# Patient Record
Sex: FEMALE | Race: WHITE | NOT HISPANIC OR LATINO | URBAN - METROPOLITAN AREA
[De-identification: names, ages, dates, MRNs, and addresses within clinical notes are randomized per-mention and may not be internally consistent; named-entity substitution may affect disease eponyms.]

---

## 2017-06-27 ENCOUNTER — OFFICE VISIT (OUTPATIENT)
Dept: FAMILY MEDICINE CLINIC | Facility: CLINIC | Age: 22
End: 2017-06-27

## 2017-06-27 VITALS
DIASTOLIC BLOOD PRESSURE: 67 MMHG | HEIGHT: 65 IN | SYSTOLIC BLOOD PRESSURE: 98 MMHG | BODY MASS INDEX: 30.99 KG/M2 | OXYGEN SATURATION: 98 % | RESPIRATION RATE: 18 BRPM | WEIGHT: 186 LBS | HEART RATE: 92 BPM

## 2017-06-27 DIAGNOSIS — G44.209 TENSION HEADACHE: Primary | ICD-10-CM

## 2017-06-27 DIAGNOSIS — G43.101 MIGRAINE WITH AURA AND WITH STATUS MIGRAINOSUS, NOT INTRACTABLE: ICD-10-CM

## 2017-06-27 PROCEDURE — 99213 OFFICE O/P EST LOW 20 MIN: CPT | Performed by: NURSE PRACTITIONER

## 2017-06-27 NOTE — PROGRESS NOTES
Subjective   Brielle Alcocer is a 21 y.o. female.   Chief Complaint   Patient presents with   • Headache     x 3 weeks, some relief sometimes. startes when she wakes up and then when she startes moving around and then is still present at bed time. Nsaid not effective     Vitals:    06/27/17 1419   BP: 98/67   Pulse: 92   Resp: 18   SpO2: 98%     Allergies   Allergen Reactions   • Penicillins        Headache    This is a new problem. The current episode started 1 to 4 weeks ago (3 weeks). The pain is located in the bilateral and occipital region. The pain radiates to the left neck and right neck. The pain quality is not similar to prior headaches. The quality of the pain is described as band-like. The pain is at a severity of 5/10. The pain is moderate. Associated symptoms include muscle aches (neck), neck pain (stiffness), phonophobia, photophobia (worse in the morning) and a visual change (this morning). Pertinent negatives include no blurred vision, coughing, dizziness, eye pain, eye redness, eye watering, fever, nausea, sinus pressure, vomiting or weakness. The symptoms are aggravated by noise and emotional stress. She has tried NSAIDs and acetaminophen for the symptoms. The treatment provided mild relief. Her past medical history is significant for migraines in the family.        The following portions of the patient's history were reviewed and updated as appropriate: allergies, current medications, past family history, past medical history, past social history, past surgical history and problem list.    Review of Systems   Constitutional: Negative for chills, diaphoresis, fatigue and fever.   HENT: Negative for congestion, postnasal drip and sinus pressure.    Eyes: Positive for photophobia (worse in the morning). Negative for blurred vision, pain and redness.   Respiratory: Negative for cough and shortness of breath.    Gastrointestinal: Negative for nausea and vomiting.   Musculoskeletal: Positive for neck  pain (stiffness).   Neurological: Positive for headaches. Negative for dizziness, facial asymmetry, speech difficulty, weakness and light-headedness.   All other systems reviewed and are negative.      Objective   Physical Exam   Constitutional: She is oriented to person, place, and time. She appears well-developed and well-nourished.   HENT:   Head: Normocephalic and atraumatic.   Right Ear: External ear normal.   Left Ear: External ear normal.   Mouth/Throat: Oropharynx is clear and moist.   Eyes: EOM are normal. Pupils are equal, round, and reactive to light.   Neck: Normal range of motion.   Cardiovascular: Normal rate.    Pulmonary/Chest: Effort normal.   Neurological: She is alert and oriented to person, place, and time. No cranial nerve deficit.   Psychiatric: She has a normal mood and affect. Her behavior is normal. Judgment and thought content normal.   Nursing note and vitals reviewed.      Assessment/Plan   Problems Addressed this Visit     None      Visit Diagnoses     Tension headache    -  Primary    Migraine with aura and with status migrainosus, not intractable            Continue NSAIDs as needed. Try heat and relaxation to back of neck to relieve tension.

## 2018-01-29 ENCOUNTER — OFFICE VISIT (OUTPATIENT)
Dept: FAMILY MEDICINE CLINIC | Facility: CLINIC | Age: 23
End: 2018-01-29

## 2018-01-29 VITALS
OXYGEN SATURATION: 99 % | WEIGHT: 186 LBS | TEMPERATURE: 98.8 F | DIASTOLIC BLOOD PRESSURE: 70 MMHG | HEART RATE: 96 BPM | SYSTOLIC BLOOD PRESSURE: 118 MMHG | HEIGHT: 65 IN | BODY MASS INDEX: 30.99 KG/M2

## 2018-01-29 DIAGNOSIS — M22.42 CHONDROMALACIA, PATELLA, LEFT: ICD-10-CM

## 2018-01-29 DIAGNOSIS — Z30.011 ENCOUNTER FOR INITIAL PRESCRIPTION OF CONTRACEPTIVE PILLS: ICD-10-CM

## 2018-01-29 DIAGNOSIS — L70.0 ACNE VULGARIS: ICD-10-CM

## 2018-01-29 DIAGNOSIS — M25.562 ACUTE PAIN OF LEFT KNEE: Primary | ICD-10-CM

## 2018-01-29 PROCEDURE — 99214 OFFICE O/P EST MOD 30 MIN: CPT | Performed by: FAMILY MEDICINE

## 2018-01-29 RX ORDER — NORGESTIMATE AND ETHINYL ESTRADIOL 7DAYSX3 LO
1 KIT ORAL DAILY
Qty: 28 TABLET | Refills: 6 | Status: SHIPPED | OUTPATIENT
Start: 2018-01-29 | End: 2019-01-03

## 2018-01-29 NOTE — PROGRESS NOTES
Subjective   Brielle Alcocer is a 22 y.o. female with   Chief Complaint   Patient presents with   • Contraception     wanting to start OCP   • Knee Pain     Left knee, not constant, more off and on, stairs make it hurt bad.  No known injury   .    History of Present Illness     Patient presents today for new onset of left knee pain for the last 3 to 4 weeks without known injury.  The pain is located to the lateral and inferior aspect of the left Patella.  Going up and down stairs, or getting out of her car causes the left knee to hurt more.  Patient denies left knee effusion or locking.    Patient wishes to start contraception as well.  She has concerns with Acne.  Menstrual cycles are regular at this time.  She has heard that the OCP can benefit her skin condition.  She has used a variety of over-the-counter products that have been ineffective.  She is not now or has ever been sexually active.  She is currently attending cosmetology school.    The following portions of the patient's history were reviewed and updated as appropriate: allergies, current medications, past family history, past medical history, past social history, past surgical history and problem list.    Review of Systems   Constitutional:        Exogenous obesity   Musculoskeletal: Positive for arthralgias (left knee pain).   Skin:        Acne vulgaris       Objective     Vitals:    01/29/18 0921   BP: 118/70   Pulse: 96   Temp: 98.8 °F (37.1 °C)   SpO2: 99%     BP Readings from Last 3 Encounters:   01/29/18 118/70   06/27/17 98/67   06/28/16 110/70      Wt Readings from Last 3 Encounters:   01/29/18 84.4 kg (186 lb)   06/27/17 84.4 kg (186 lb)   06/28/16 84.4 kg (186 lb)        Physical Exam   Constitutional: She is oriented to person, place, and time. Vital signs are normal. She appears well-developed and well-nourished.   Minimal obesity   HENT:   Head: Normocephalic and atraumatic.   Pulmonary/Chest: Effort normal.   Musculoskeletal:        Left  knee: Normal.   Left knee is without deformity, effusion or other abnormality.  Range of motion is full.  Ligaments are stable with negative for sign are negative Lachman.  Trina's is negative.  Entrapment is negative.  There is minimal patellar tenderness with positive patellar crepitance.   Neurological: She is alert and oriented to person, place, and time. She has normal strength. No sensory deficit.   Skin: Skin is warm, dry and intact. Rash noted. Rash is papular and pustular.   Face with numerous erythematous papular lesions as well as pustules and comedones.  This includes facial cheeks as well as mandibular region.  Chest and back are spared.   Psychiatric: She has a normal mood and affect. Her speech is normal and behavior is normal. Judgment and thought content normal. Cognition and memory are normal.   Nursing note and vitals reviewed.      Assessment/Plan   Brielle was seen today for contraception and knee pain.    Diagnoses and all orders for this visit:    Acute pain of left knee  -     Ambulatory Referral to Physical Therapy    Chondromalacia, patella, left  -     Ambulatory Referral to Physical Therapy    Encounter for initial prescription of contraceptive pills    Acne vulgaris    Other orders  -     norgestimate-ethinyl estradiol (ORTHO TRI-CYCLEN LO) 0.18/0.215/0.25 MG-25 MCG per tablet; Take 1 tablet by mouth Daily.        Return in about 3 months (around 4/29/2018).        Scribed for Cristobal Baez MD by Yasmani Arguelles. 01/29/2018    ICristobal MD personally performed the services described in this documentation, as scribed by Yasmani Arguelles in my presence, and it is both accurate and complete

## 2018-01-30 PROBLEM — M22.42 CHONDROMALACIA, PATELLA, LEFT: Status: ACTIVE | Noted: 2018-01-30

## 2018-01-30 PROBLEM — L70.0 ACNE VULGARIS: Status: ACTIVE | Noted: 2018-01-30

## 2018-02-07 ENCOUNTER — TREATMENT (OUTPATIENT)
Dept: PHYSICAL THERAPY | Facility: CLINIC | Age: 23
End: 2018-02-07

## 2018-02-07 DIAGNOSIS — M25.562 ACUTE PAIN OF LEFT KNEE: Primary | ICD-10-CM

## 2018-02-07 PROCEDURE — 97161 PT EVAL LOW COMPLEX 20 MIN: CPT | Performed by: PHYSICAL THERAPIST

## 2018-02-07 PROCEDURE — 97110 THERAPEUTIC EXERCISES: CPT | Performed by: PHYSICAL THERAPIST

## 2018-02-07 NOTE — PROGRESS NOTES
Physical Therapy Initial Evaluation and Plan of Care    TIME IN 11:08 TIME OUT 11:45  Patient: Brielle Alcocer   : 1995  Diagnosis/ICD-10 Code:  Acute pain of left knee [M25.562]  Referring practitioner: Cristobal Baez DO    Subjective Evaluation    History of Present Illness  Onset date: one month ago.  Mechanism of injury: Pt is a 23 y/o WF who reports to the clinic with c/o left knee pain for one month.  Pt denies having a specific injury that caused her knee pain.  Pt is currently in school and has several steps to go up and down through-out the day and started noticing increased knee pain with climbing the steps.  Pt denies having a prior Hx of left knee pain or injuries.        Patient Occupation: college student  Quality of life: excellent    Pain  Current pain ratin  At worst pain ratin  Location: left superior, lateral, and inferior knee pain   Quality: sharp  Relieving factors: change in position (sit or take the pressure off of it )  Aggravating factors: stairs and squatting (bending it with pressure on it )    Diagnostic Tests  No diagnostic tests performed    Treatments  No previous or current treatments  Patient Goals  Patient goals for therapy: decreased pain and return to sport/leisure activities             Objective       Tenderness   Left Knee   Tenderness in the lateral patella, medial patella and superior patella.     Active Range of Motion   Left Knee   Flexion: 135 degrees   Extension: 0 degrees     Right Knee   Flexion: 132 degrees   Extension: 0 degrees     Patellar Static Positioning     Additional Patellar Static Positioning Details  Lateral glide with the last 20 degrees of TKE left knee     Strength/Myotome Testing     Left Hip   Planes of Motion   Flexion: 4  Abduction: 5  Adduction: 4    Right Hip   Planes of Motion   Flexion: 5  Abduction: 5  Adduction: 4+    Left Knee   Flexion: 4+  Extension: 4+    Right Knee   Flexion: 5  Extension: 5    Tests     Left Hip    Negative Ely's and Kristie.   Domingo: Negative.   90/90 SLR: Negative.   SLR: Negative.     Right Hip   Negative Ely's and Kristie.   Domingo: Positive.   90/90 SLR: Negative.  SLR: Negative.     Left Knee   Positive patella-femoral grind.   Negative anterior drawer, lateral Trina, medial Trina, posterior drawer, valgus stress test at 0 degrees, valgus stress test at 30 degrees, varus stress test at 0 degrees and varus stress test at 30 degrees.     Right Knee   Negative anterior drawer, lateral Trina, medial Trina, patella-femoral grind, posterior drawer, valgus stress test at 0 degrees, valgus stress test at 30 degrees, varus stress test at 0 degrees and varus stress test at 30 degrees.          Assessment & Plan     Assessment  Impairments: impaired physical strength and pain with function  Assessment details: Pt is a 21 y/o WF who reports to the clinic with c/o left knee pain, decreased flexibility, tenderness, and decreased functional mobility with getting into and out  of car and going up the steps.      Prognosis: good    Goals  Plan Goals: STGs:3-4 weeks  1. Decrease left  knee pain 1/10 with steps and getting into the car.  2. Decrease left medial, lateral, and superior patella tenderness to minimal to none with palpation.     3. Pt independent with HEP  4 Increase pt's left hip flex and add strength to 4+-5/5  5. Pt has a negative Domingo test B  6.  Increase left knee strength to 5/5.        Plan  Therapy options: will be seen for skilled physical therapy services  Planned modality interventions: cryotherapy, thermotherapy (hydrocollator packs) and ultrasound  Planned therapy interventions: home exercise program, flexibility, strengthening and stretching  Frequency: 1x week  Duration in weeks: 4  Treatment plan discussed with: patient        Manual Therapy:         mins  84399;  Therapeutic Exercise:    15     mins  21729;     Neuromuscular Ольга:        mins  60587;    Therapeutic Activity:           mins  43603;     Gait Training:           mins  25530;     Ultrasound:          mins  85532;    Electrical Stimulation:         mins  41105 ( );  Dry Needling          mins self-pay    Timed Treatment:   15   mins   Total Treatment:     37   mins    PT SIGNATURE: Mariaa Parisi, PT   DATE TREATMENT INITIATED: 2/7/2018    Initial Certification  Certification Period: 5/8/2018  I certify that the therapy services are furnished while this patient is under my care.  The services outlined above are required by this patient, and will be reviewed every 90 days.     PHYSICIAN: Cristobal Baez, DO      DATE:     Please sign and return via fax to [phone].. Thank you, [facility].

## 2018-02-15 ENCOUNTER — TREATMENT (OUTPATIENT)
Dept: PHYSICAL THERAPY | Facility: CLINIC | Age: 23
End: 2018-02-15

## 2018-02-15 DIAGNOSIS — M25.562 ACUTE PAIN OF LEFT KNEE: Primary | ICD-10-CM

## 2018-02-15 PROCEDURE — 97110 THERAPEUTIC EXERCISES: CPT | Performed by: PHYSICAL THERAPIST

## 2018-02-15 PROCEDURE — 97530 THERAPEUTIC ACTIVITIES: CPT | Performed by: PHYSICAL THERAPIST

## 2018-02-15 NOTE — PROGRESS NOTES
Physical Therapy Daily Progress Note    Time In 8:10  Time Out 9:00    Visit # : 2  Brielle Alcocer reports: her knee was really sore after last visit, but denies any increased pain.      Subjective     Objective   See Exercise, Manual, and Modality Logs for complete treatment.       Assessment & Plan     Assessment  Assessment details: Pt is doing well with all stretches and exercises.  Denies having any increased pain with CKC strengthening exercises.  Still slight tenderness left inferior knee joint today.  Will continue to see pt once per week for strengthening then discharge to [facility].          Progress per Plan of Care           Manual Therapy:         mins  60367;  Therapeutic Exercise:   20      mins  74700;     Neuromuscular Ольга:        mins  82133;    Therapeutic Activity:    15      mins  09735;     Gait Training:           mins  49706;     Ultrasound:          mins  00224;    Electrical Stimulation:         mins  77039 ( );  Dry Needling          mins self-pay    Timed Treatment:   35   mins   Total Treatment:     50   mins    Mariaa Parisi, PT  Physical Therapist

## 2018-02-22 ENCOUNTER — TREATMENT (OUTPATIENT)
Dept: PHYSICAL THERAPY | Facility: CLINIC | Age: 23
End: 2018-02-22

## 2018-02-22 DIAGNOSIS — M25.562 ACUTE PAIN OF LEFT KNEE: Primary | ICD-10-CM

## 2018-02-22 PROCEDURE — 97110 THERAPEUTIC EXERCISES: CPT | Performed by: PHYSICAL THERAPIST

## 2018-02-22 PROCEDURE — 97035 APP MDLTY 1+ULTRASOUND EA 15: CPT | Performed by: PHYSICAL THERAPIST

## 2018-02-22 PROCEDURE — 97530 THERAPEUTIC ACTIVITIES: CPT | Performed by: PHYSICAL THERAPIST

## 2018-02-22 NOTE — PROGRESS NOTES
"Physical Therapy Daily Progress Note    Time In 8:07  Time Out 8:46    Visit # : 3  Brielle Alcocer reports: she has noticed her knee popping more and feeling like it has \"air in it.\"      Subjective     Objective       Tenderness   Left Knee   Tenderness in the patellar tendon.     Additional Tenderness Details  Minimal tenderness left inferior medial knee joint      See Exercise, Manual, and Modality Logs for complete treatment.       Assessment & Plan     Assessment  Assessment details: Pt is tolerating treatment fairly well, but having some inferior patella pain and crepitus with deep squats.  Reviewed HEP and reinforced the importance of not squatting too deep and having proper form during lateral knee dips and squats.  Tried pulsed US treatment today to decrease tenderness and pain.  Will reassess pt's symptoms next visit.          Progress per Plan of Care           Manual Therapy:         mins  27064;  Therapeutic Exercise:   20      mins  40898;     Neuromuscular Ольга:        mins  81427;    Therapeutic Activity:    10      mins  27450;     Gait Training:           mins  20586;     Ultrasound:     8     mins  39475;    Electrical Stimulation:         mins  66642 ( );  Dry Needling          mins self-pay    Timed Treatment:  38    mins   Total Treatment:     39   mins    Mariaa Parisi, PT  Physical Therapist  "

## 2018-03-01 ENCOUNTER — TREATMENT (OUTPATIENT)
Dept: PHYSICAL THERAPY | Facility: CLINIC | Age: 23
End: 2018-03-01

## 2018-03-01 DIAGNOSIS — M25.562 ACUTE PAIN OF LEFT KNEE: Primary | ICD-10-CM

## 2018-03-01 PROCEDURE — 97530 THERAPEUTIC ACTIVITIES: CPT | Performed by: PHYSICAL THERAPIST

## 2018-03-01 PROCEDURE — 97110 THERAPEUTIC EXERCISES: CPT | Performed by: PHYSICAL THERAPIST

## 2018-03-01 PROCEDURE — 97035 APP MDLTY 1+ULTRASOUND EA 15: CPT | Performed by: PHYSICAL THERAPIST

## 2018-03-01 NOTE — PROGRESS NOTES
Physical Therapy Daily Progress Note    Time In 9:00  Time Out 9:45    Visit # : 4  Brielle Alcocer reports: her knee is better than it was last week, but she has noticed her right knee is starting to hurt.      Subjective     Objective       Tenderness   Left Knee   Tenderness in the inferior patella, lateral patella, medial patella and patellar tendon.      See Exercise, Manual, and Modality Logs for complete treatment.       Assessment & Plan     Assessment  Assessment details: Pt is responding to treatment well with decreasing tenderness and pain in left knee from last week.  Pt demonstrated good form with squats today without c/o increased knee pain.  Pt starting to c/o right knee pain and educated pt about starting her HEP for both knees.  Feel pt's symptoms in her right knee is due to compensation for the left knee.  Will continue to see pt once per week for strengthening and modalities PRN for pain.          Progress per Plan of Care           Manual Therapy:         mins  81679;  Therapeutic Exercise:   22      mins  28137;     Neuromuscular Ольга:        mins  21893;    Therapeutic Activity:   10       mins  26397;     Gait Training:           mins  64047;     Ultrasound:    8      mins  75714;    Electrical Stimulation:         mins  28481 ( );  Dry Needling          mins self-pay    Timed Treatment:   40   mins   Total Treatment:     45   mins    Mariaa Parisi, PT  Physical Therapist

## 2018-03-09 ENCOUNTER — TREATMENT (OUTPATIENT)
Dept: PHYSICAL THERAPY | Facility: CLINIC | Age: 23
End: 2018-03-09

## 2018-03-09 DIAGNOSIS — M25.562 ACUTE PAIN OF LEFT KNEE: Primary | ICD-10-CM

## 2018-03-09 PROCEDURE — 97110 THERAPEUTIC EXERCISES: CPT | Performed by: PHYSICAL THERAPIST

## 2018-03-09 PROCEDURE — 97530 THERAPEUTIC ACTIVITIES: CPT | Performed by: PHYSICAL THERAPIST

## 2018-03-09 NOTE — PROGRESS NOTES
Physical Therapy Daily Progress Note    Time In 11:00  Time Out 11:40    Visit # : 5  Brielle Alcocer reports: she is doing fine. Pt states she doesn't have any issues.   Pt denies having any knee pain B this week.  States if she gets knee pain it may be with going down the steps, but she is careful and goes down slower.      Subjective     Objective       Palpation     Additional Palpation Details  Pt denies having any point tenderness medial, inferior, and lateral left knee.      Strength/Myotome Testing     Left Hip   Planes of Motion   Flexion: 5  Abduction: 5  Adduction: 5    Left Knee   Flexion: 5  Extension: 5    Tests     Left Hip   Domingo: Negative.     Right Hip   Domingo: Negative.       See Exercise, Manual, and Modality Logs for complete treatment.       Assessment & Plan     Assessment  Assessment details: Pt is doing well with good strength, decreased pain, denies tenderness, and improving functional mobility without pain.  Pt has accomplished all goals and feel pt can continue at home with HEP.          Other           Manual Therapy:         mins  72028;  Therapeutic Exercise:    20     mins  79533;     Neuromuscular Ольга:        mins  94794;    Therapeutic Activity:    10      mins  94805;     Gait Training:           mins  10644;     Ultrasound:          mins  88903;    Electrical Stimulation:         mins  95989 ( );  Dry Needling          mins self-pay    Timed Treatment:  30    mins   Total Treatment:     40   mins    Mariaa Parisi, PT  Physical Therapist

## 2018-03-09 NOTE — PROGRESS NOTES
Discharge Summary  Discharge Summary from Physical Therapy Report      Dates  PT visit: 2/7/2018-3/9/2018  Number of Visits: 5     Discharge Status of Patient: See progress note dated 3/9/2018 for objective measures.      Goals: All Met    Discharge Plan: Continue with current home exercise program as instructed    Date of Discharge 3/9/2018        Mariaa Parisi, PT  Physical Therapist

## 2018-08-23 RX ORDER — NORGESTIMATE AND ETHINYL ESTRADIOL
1 KIT DAILY
Qty: 28 TABLET | Refills: 6 | OUTPATIENT
Start: 2018-08-23 | End: 2019-08-23

## 2019-01-03 ENCOUNTER — OFFICE VISIT (OUTPATIENT)
Dept: FAMILY MEDICINE CLINIC | Facility: CLINIC | Age: 24
End: 2019-01-03

## 2019-01-03 VITALS
OXYGEN SATURATION: 98 % | DIASTOLIC BLOOD PRESSURE: 74 MMHG | RESPIRATION RATE: 16 BRPM | BODY MASS INDEX: 32.65 KG/M2 | WEIGHT: 196 LBS | TEMPERATURE: 101.4 F | HEIGHT: 65 IN | HEART RATE: 106 BPM | SYSTOLIC BLOOD PRESSURE: 110 MMHG

## 2019-01-03 DIAGNOSIS — R50.9 FEVER, UNSPECIFIED FEVER CAUSE: ICD-10-CM

## 2019-01-03 DIAGNOSIS — J02.9 SORE THROAT: ICD-10-CM

## 2019-01-03 DIAGNOSIS — J11.1 INFLUENZA: Primary | ICD-10-CM

## 2019-01-03 LAB
EXPIRATION DATE: ABNORMAL
FLUAV AG NPH QL: POSITIVE
FLUBV AG NPH QL: NEGATIVE
INTERNAL CONTROL: ABNORMAL
Lab: ABNORMAL

## 2019-01-03 PROCEDURE — 99213 OFFICE O/P EST LOW 20 MIN: CPT | Performed by: NURSE PRACTITIONER

## 2019-01-03 PROCEDURE — 87804 INFLUENZA ASSAY W/OPTIC: CPT | Performed by: NURSE PRACTITIONER

## 2019-01-03 RX ORDER — OSELTAMIVIR PHOSPHATE 75 MG/1
CAPSULE ORAL
COMMUNITY
Start: 2019-01-02 | End: 2019-05-22

## 2019-01-03 NOTE — PROGRESS NOTES
"Chief Complaint   Patient presents with   • Cough   • Sore Throat   • Fever       Upper Respiratory Infection: Patient complains of symptoms of a URI. Symptoms include congestion, cough, fever and sore throat. Onset of symptoms was 2 days ago, gradually worsening since that time. She also c/o achiness, congestion, post nasal drip and sore throat for the past 2 days .  She is drinking moderate amounts of fluids. Evaluation to date: Seen at [facility] yesterday and diagnosed with flu due to known exposure of flu with friend. Treatment to date: Tamiflu, cough medicine, and ibuprofen with some relief.  .  Ill contacts at home or school or work discussed.    The following portions of the patient's history were reviewed and updated as appropriate: allergies, current medications, past family history, past medical history, past social history, past surgical history and problem list.    A comprehensive review of systems was negative except for: Constitutional: positive for chills, fatigue and fevers  Ears, nose, mouth, throat, and face: positive for nasal congestion and sore throat  Respiratory: positive for cough    Vitals:    01/03/19 1545   BP: 110/74   BP Location: Right arm   Patient Position: Sitting   Cuff Size: Large Adult   Pulse: 106   Resp: 16   Temp: (!) 101.4 °F (38.6 °C)   SpO2: 98%   Weight: 88.9 kg (196 lb)   Height: 165.1 cm (65\")     Gen: Ill appearing, alert  Ears: TM's bulging without redness  Nose:  Congestion  Throat:  Red without exudate, some drainage, tonsils okay  Neck: No LAD  Lung: Good air movement, regular RR  Heart: RR without murmur  Skin: No rash      Assessment/Plan   Brielle was seen today for cough, sore throat and fever.    Diagnoses and all orders for this visit:    Influenza    Sore throat  -     POCT Influenza A/B    Fever, unspecified fever cause  -     POCT Influenza A/B           Patient Instructions   Influenza, Adult  Influenza (“the flu\") is an infection in the lungs, nose, " and throat (respiratory tract). It is caused by a virus. The flu causes many common cold symptoms, as well as a high fever and body aches. It can make you feel very sick.  The flu spreads easily from person to person (is contagious). Getting a flu shot (influenza vaccination) every year is the best way to prevent the flu.  Follow these instructions at home:  · Take over-the-counter and prescription medicines only as told by your doctor.  · Use a cool mist humidifier to add moisture (humidity) to the air in your home. This can make it easier to breathe.  · Rest as needed.  · Drink enough fluid to keep your pee (urine) clear or pale yellow.  · Cover your mouth and nose when you cough or sneeze.  · Wash your hands with soap and water often, especially after you cough or sneeze. If you cannot use soap and water, use hand .  · Stay home from work or school as told by your doctor. Unless you are visiting your doctor, try to avoid leaving home until your fever has been gone for 24 hours without the use of medicine.  · Keep all follow-up visits as told by your doctor. This is important.  How is this prevented?  · Getting a yearly (annual) flu shot is the best way to avoid getting the flu. You may get the flu shot in late summer, fall, or winter. Ask your doctor when you should get your flu shot.  · Wash your hands often or use hand  often.  · Avoid contact with people who are sick during cold and flu season.  · Eat healthy foods.  · Drink plenty of fluids.  · Get enough sleep.  · Exercise regularly.  Contact a doctor if:  · You get new symptoms.  · You have:  ? Chest pain.  ? Watery poop (diarrhea).  ? A fever.  · Your cough gets worse.  · You start to have more mucus.  · You feel sick to your stomach (nauseous).  · You throw up (vomit).  Get help right away if:  · You start to be short of breath or have trouble breathing.  · Your skin or nails turn a bluish color.  · You have very bad pain or stiffness in  your neck.  · You get a sudden headache.  · You get sudden pain in your face or ear.  · You cannot stop throwing up.  This information is not intended to replace advice given to you by your health care provider. Make sure you discuss any questions you have with your health care provider.  Document Released: 09/26/2009 Document Revised: 05/25/2017 Document Reviewed: 10/11/2016  [employer] Interactive Patient Education © 2017 [employer]        Tylenol or Advil as needed for pain, fever, muscle aches  Plenty of fluids  Hand washing discussed  Off work or school note given if needed.  Warm tea for throat.  Pros and cons of antibiotic use discussed    RICHIE Camara  Family Practice  [facility]

## 2019-05-22 ENCOUNTER — OFFICE VISIT (OUTPATIENT)
Dept: FAMILY MEDICINE CLINIC | Facility: CLINIC | Age: 24
End: 2019-05-22

## 2019-05-22 VITALS
SYSTOLIC BLOOD PRESSURE: 118 MMHG | DIASTOLIC BLOOD PRESSURE: 80 MMHG | HEIGHT: 65 IN | OXYGEN SATURATION: 98 % | HEART RATE: 84 BPM | BODY MASS INDEX: 33.32 KG/M2 | WEIGHT: 200 LBS

## 2019-05-22 DIAGNOSIS — F51.04 PSYCHOPHYSIOLOGICAL INSOMNIA: Primary | ICD-10-CM

## 2019-05-22 PROCEDURE — 99213 OFFICE O/P EST LOW 20 MIN: CPT | Performed by: FAMILY MEDICINE

## 2019-05-22 RX ORDER — DOXEPIN HYDROCHLORIDE 10 MG/1
CAPSULE ORAL
Qty: 90 CAPSULE | Refills: 1 | Status: SHIPPED | OUTPATIENT
Start: 2019-05-22 | End: 2019-07-03 | Stop reason: ALTCHOICE

## 2019-05-23 NOTE — PATIENT INSTRUCTIONS
Long discussion in regards to appropriate sleep hygiene.  Doxepin will be initiated with a titration discussed with patient.  She will initiate this product at 1 pill 1 hour before bedtime gradually titrate this dose up as per effect and per side effects.  This office with any adverse change condition or side effect with the above product.

## 2019-05-23 NOTE — PROGRESS NOTES
Subjective   Brielle Alcocer is a 23 y.o. female with   Chief Complaint   Patient presents with   • Insomnia     she actually can sleep, falling asleep is the problem   .    History of Present Illness   23-year-old white female here with complaint of several months of insomnia.  She apparently has had issues going to sleep and sustaining sleep since high school.  She states that she tries to go to sleep at a regular time he has difficulty falling asleep and often lays in bed for several hours.  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  During the day she is somewhat fatigued due to her lack of sleep has difficulty performing her normal task.  In general moods have been good with no evidence of depression or anxiety features.  He does admit to overeating with increased weight gain.  In general she is able to concentrate well and has good motivation.  She does participate in activities of usual enjoyment and denies suicidal or homicidal ideation.  The following portions of the patient's history were reviewed and updated as appropriate: allergies, current medications, past family history, past medical history, past social history, past surgical history and problem list.    Review of Systems   Constitutional:        Obese   Psychiatric/Behavioral: Positive for sleep disturbance. Negative for dysphoric mood. The patient is not nervous/anxious.        Objective     Vitals:    05/22/19 1436   BP: 118/80   Pulse: 84   SpO2: 98%       No results found for this or any previous visit (from the past 168 hour(s)).    Physical Exam   Constitutional: She is oriented to person, place, and time. She appears well-developed and well-nourished.   HENT:   Head: Normocephalic and atraumatic.   Neck: Trachea normal and phonation normal. Neck supple. Normal carotid pulses present. Carotid bruit is not present. No thyroid mass and no thyromegaly present.   Cardiovascular: Normal rate, regular  rhythm and normal heart sounds. Exam reveals no gallop and no friction rub.   No murmur heard.  Pulmonary/Chest: Effort normal and breath sounds normal. No respiratory distress. She has no decreased breath sounds. She has no wheezes. She has no rhonchi. She has no rales.   Lymphadenopathy:     She has no cervical adenopathy.   Neurological: She is alert and oriented to person, place, and time.   Skin: Skin is warm and dry. No rash noted.   Psychiatric: She has a normal mood and affect. Her speech is normal and behavior is normal. Judgment and thought content normal. Cognition and memory are normal.   Nursing note and vitals reviewed.      Assessment/Plan   Brielle was seen today for insomnia.    Diagnoses and all orders for this visit:    Psychophysiological insomnia  -     doxepin (SINEquan) 10 MG capsule; 3 po q hs        Return in about 4 weeks (around 6/19/2019) for Recheck.

## 2019-07-03 ENCOUNTER — OFFICE VISIT (OUTPATIENT)
Dept: FAMILY MEDICINE CLINIC | Facility: CLINIC | Age: 24
End: 2019-07-03

## 2019-07-03 VITALS
OXYGEN SATURATION: 98 % | WEIGHT: 209 LBS | SYSTOLIC BLOOD PRESSURE: 108 MMHG | BODY MASS INDEX: 34.82 KG/M2 | HEIGHT: 65 IN | DIASTOLIC BLOOD PRESSURE: 70 MMHG | HEART RATE: 78 BPM

## 2019-07-03 DIAGNOSIS — F51.04 PSYCHOPHYSIOLOGICAL INSOMNIA: Primary | ICD-10-CM

## 2019-07-03 PROCEDURE — 99213 OFFICE O/P EST LOW 20 MIN: CPT | Performed by: FAMILY MEDICINE

## 2019-07-03 RX ORDER — ZOLPIDEM TARTRATE 10 MG/1
10 TABLET ORAL NIGHTLY PRN
Qty: 30 TABLET | Refills: 3 | Status: SHIPPED | OUTPATIENT
Start: 2019-07-03 | End: 2021-08-17

## 2019-07-06 NOTE — PROGRESS NOTES
Subjective   Brielle Alcocer is a 23 y.o. female with   Chief Complaint   Patient presents with   • Insomnia     follow up on medication, doesnt work as well as she hoped   .    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  Patient was started on doxepin at 10 mg nightly without success.  She gradually increase this dose to a full 50 mg nightly benefit.  She is working full-time living at home with her parents.  She denies overt stress and in general is doing quite well.  Today she has good energy, motivation and good concentration.  She has a long history of exogenous obesity but states that her appetite is normal.  Patient denies increased irritability or easy agitation and there has been no thoughts of suicide or homicide.  The following portions of the patient's history were reviewed and updated as appropriate: allergies, current medications, past family history, past medical history, past social history, past surgical history and problem list.    Review of Systems   Psychiatric/Behavioral: Positive for sleep disturbance.       Objective     Vitals:    07/03/19 1451   BP: 108/70   Pulse: 78   SpO2: 98%       No results found for this or any previous visit (from the past 168 hour(s)).    Physical Exam   Constitutional: She is oriented to person, place, and time. She appears well-developed and well-nourished.   HENT:   Head: Normocephalic and atraumatic.   Neck: Trachea normal and phonation normal. Neck supple. Normal carotid pulses present. Carotid bruit is not present. No thyroid mass and no thyromegaly present.   Cardiovascular: Normal rate, regular rhythm and normal heart sounds. Exam reveals no gallop and no friction rub.   No murmur heard.  Pulmonary/Chest: Effort normal and breath sounds normal. No respiratory distress. She has no decreased breath sounds. She has no wheezes. She has no rhonchi. She has no rales.   Lymphadenopathy:     She has no cervical  adenopathy.   Neurological: She is alert and oriented to person, place, and time.   Skin: Skin is warm and dry. No rash noted.   Psychiatric: She has a normal mood and affect. Her speech is normal and behavior is normal. Judgment and thought content normal. Cognition and memory are normal.   Nursing note and vitals reviewed.      Assessment/Plan   Brielle was seen today for insomnia.    Diagnoses and all orders for this visit:    Psychophysiological insomnia  -     zolpidem (AMBIEN) 10 MG tablet; Take 1 tablet by mouth At Night As Needed for Sleep.        Return in about 3 months (around 10/3/2019) for Recheck.

## 2021-08-16 ENCOUNTER — OFFICE VISIT (OUTPATIENT)
Dept: FAMILY MEDICINE CLINIC | Facility: CLINIC | Age: 26
End: 2021-08-16

## 2021-08-16 VITALS
OXYGEN SATURATION: 97 % | RESPIRATION RATE: 16 BRPM | TEMPERATURE: 98.2 F | SYSTOLIC BLOOD PRESSURE: 134 MMHG | BODY MASS INDEX: 31.99 KG/M2 | HEART RATE: 84 BPM | DIASTOLIC BLOOD PRESSURE: 82 MMHG | HEIGHT: 65 IN | WEIGHT: 192 LBS

## 2021-08-16 DIAGNOSIS — R53.83 FATIGUE, UNSPECIFIED TYPE: Primary | ICD-10-CM

## 2021-08-16 DIAGNOSIS — Z86.16 HISTORY OF COVID-19: ICD-10-CM

## 2021-08-16 PROCEDURE — 99213 OFFICE O/P EST LOW 20 MIN: CPT | Performed by: NURSE PRACTITIONER

## 2021-08-16 NOTE — PROGRESS NOTES
Patient ID: Brielle Alcocer is a 25 y.o. female     Patient Care Team:  Cristobal Baez DO as PCP - General (Family Medicine)    Subjective     Chief Complaint   Patient presents with   • Fatigue   • loss of taste and smell     7 1/2 months ago had COVID       History of Present Illness    Brielle Alcocer presents to [facility] today for problems of fatigue and loss of taste and smell since Covid infection.  Sense of taste has gradually improved however most worrisome is her fatigue.      Tested positive for Covid on January 23, 2021.  Exposed when out with friends on 1/21/2021.    Symptoms at that time for Covid. Congestion, loss of taste and smell.  Fever at night with Tmax of 99.0.    Continued fatigue.  Decreased sense of smell and taste.      She denies any complaints of fever, chills, cough, chest pain, shortness of air, abdominal pain, nausea, or any other concerns.     The following portions of the patient's history were reviewed and updated as appropriate: allergies, current medications, past family history, past medical history, past social history, past surgical history and problem list.       ROS    Vitals:    08/16/21 1338   BP: 134/82   Pulse: 84   Resp: 16   Temp: 98.2 °F (36.8 °C)   SpO2: 97%       Documented weights    08/16/21 1338   Weight: 87.1 kg (192 lb)     Body mass index is 31.95 kg/m².    Results for orders placed or performed in visit on 01/03/19   POCT Influenza A/B    Specimen: Swab   Result Value Ref Range    Rapid Influenza A Ag Positive (A) Negative    Rapid Influenza B Ag Negative Negative    Internal Control Passed Passed    Lot Number [id number]     Expiration Date 3/1/2021            Objective     Physical Exam  Vitals reviewed.   Constitutional:       General: She is not in acute distress.  Cardiovascular:      Rate and Rhythm: Normal rate and regular rhythm.      Heart sounds: No murmur heard.     Pulmonary:      Effort: Pulmonary effort is  normal.      Breath sounds: Normal breath sounds. No wheezing.   Musculoskeletal:      Right lower leg: No edema.      Left lower leg: No edema.   Lymphadenopathy:      Cervical: No cervical adenopathy.   Skin:     General: Skin is warm and dry.   Neurological:      Mental Status: She is alert and oriented to person, place, and time.   Psychiatric:         Mood and Affect: Mood normal.            Assessment/Plan     Assessment/Plan     Diagnoses and all orders for this visit:    1. Fatigue, unspecified type (Primary)  -     CBC & Differential  -     Comprehensive Metabolic Panel  -     TSH  -     Vitamin B12 & Folate  -     Vitamin D 25 Hydroxy    2. History of COVID-19  -     CBC & Differential  -     Comprehensive Metabolic Panel  -     Vitamin D 25 Hydroxy          Summary:  Brielle Alcocer present office today due to continued fatigue along with decreased sense of taste and smell.  Reassured her this has been common findings in some patients after Covid infection.  Symptoms have been known to last longer than 6 months in some patients.  Reassuring her sense of taste has improved somewhat however some foods do not taste as well as used to.  Advised continue to monitor for now.  Make sure she drinks plenty of fluids.  She is past due for dental exam which would be helpful.  Concerning fatigue, get plenty of rest, increasing fluid intake can also help.  We will also check blood work today for further evaluation.  Results will determine further recommendations.    In the meantime, instructed to contact us sooner for any problems or concerns.    Follow Up:  Return if symptoms worsen or fail to improve.    Patient was given instructions and counseling regarding condition or for health maintenance advice.  Please see specific information pulled into the AVS if appropriate.      Patient was wearing facemask when I entered the room and throughout our encounter. Protective equipment was worn throughout this patient  encounter including a face mask, eye protection,  and gloves. Hand hygiene was performed before donning protective equipment and after removal when leaving the room.     Catia Gastelum, APRN  Family Medicine  [facility]  08/17/21  12:43 EDT

## 2021-08-17 LAB
25(OH)D3+25(OH)D2 SERPL-MCNC: 19.6 NG/ML (ref 30–100)
ALBUMIN SERPL-MCNC: 4.7 G/DL (ref 3.9–5)
ALBUMIN/GLOB SERPL: 2.2 {RATIO} (ref 1.2–2.2)
ALP SERPL-CCNC: 82 IU/L (ref 48–121)
ALT SERPL-CCNC: 12 IU/L (ref 0–32)
AST SERPL-CCNC: 12 IU/L (ref 0–40)
BASOPHILS # BLD AUTO: 0.1 X10E3/UL (ref 0–0.2)
BASOPHILS NFR BLD AUTO: 1 %
BILIRUB SERPL-MCNC: 0.3 MG/DL (ref 0–1.2)
BUN SERPL-MCNC: 10 MG/DL (ref 6–20)
BUN/CREAT SERPL: 14 (ref 9–23)
CALCIUM SERPL-MCNC: 9.6 MG/DL (ref 8.7–10.2)
CHLORIDE SERPL-SCNC: 105 MMOL/L (ref 96–106)
CO2 SERPL-SCNC: 25 MMOL/L (ref 20–29)
CREAT SERPL-MCNC: 0.72 MG/DL (ref 0.57–1)
EOSINOPHIL # BLD AUTO: 0.1 X10E3/UL (ref 0–0.4)
EOSINOPHIL NFR BLD AUTO: 1 %
ERYTHROCYTE [DISTWIDTH] IN BLOOD BY AUTOMATED COUNT: 12.7 % (ref 11.7–15.4)
FOLATE SERPL-MCNC: 5.8 NG/ML
GLOBULIN SER CALC-MCNC: 2.1 G/DL (ref 1.5–4.5)
GLUCOSE SERPL-MCNC: 77 MG/DL (ref 65–99)
HCT VFR BLD AUTO: 43.1 % (ref 34–46.6)
HGB BLD-MCNC: 14.5 G/DL (ref 11.1–15.9)
IMM GRANULOCYTES # BLD AUTO: 0 X10E3/UL (ref 0–0.1)
IMM GRANULOCYTES NFR BLD AUTO: 0 %
LYMPHOCYTES # BLD AUTO: 2.2 X10E3/UL (ref 0.7–3.1)
LYMPHOCYTES NFR BLD AUTO: 23 %
MCH RBC QN AUTO: 28.7 PG (ref 26.6–33)
MCHC RBC AUTO-ENTMCNC: 33.6 G/DL (ref 31.5–35.7)
MCV RBC AUTO: 85 FL (ref 79–97)
MONOCYTES # BLD AUTO: 0.8 X10E3/UL (ref 0.1–0.9)
MONOCYTES NFR BLD AUTO: 8 %
NEUTROPHILS # BLD AUTO: 6.6 X10E3/UL (ref 1.4–7)
NEUTROPHILS NFR BLD AUTO: 67 %
PLATELET # BLD AUTO: 298 X10E3/UL (ref 150–450)
POTASSIUM SERPL-SCNC: 4.7 MMOL/L (ref 3.5–5.2)
PROT SERPL-MCNC: 6.8 G/DL (ref 6–8.5)
RBC # BLD AUTO: 5.06 X10E6/UL (ref 3.77–5.28)
SODIUM SERPL-SCNC: 143 MMOL/L (ref 134–144)
TSH SERPL DL<=0.005 MIU/L-ACNC: 1.02 UIU/ML (ref 0.45–4.5)
VIT B12 SERPL-MCNC: 387 PG/ML (ref 232–1245)
WBC # BLD AUTO: 9.9 X10E3/UL (ref 3.4–10.8)

## 2021-08-19 DIAGNOSIS — E55.9 VITAMIN D DEFICIENCY: Primary | ICD-10-CM

## 2021-08-19 RX ORDER — ERGOCALCIFEROL 1.25 MG/1
50000 CAPSULE ORAL WEEKLY
Qty: 12 CAPSULE | Refills: 0 | Status: SHIPPED | OUTPATIENT
Start: 2021-08-19 | End: 2022-01-03

## 2021-09-27 ENCOUNTER — OFFICE VISIT (OUTPATIENT)
Dept: FAMILY MEDICINE CLINIC | Facility: CLINIC | Age: 26
End: 2021-09-27

## 2021-09-27 VITALS
DIASTOLIC BLOOD PRESSURE: 80 MMHG | BODY MASS INDEX: 31.49 KG/M2 | WEIGHT: 189 LBS | OXYGEN SATURATION: 99 % | HEIGHT: 65 IN | TEMPERATURE: 98.4 F | SYSTOLIC BLOOD PRESSURE: 120 MMHG | RESPIRATION RATE: 13 BRPM | HEART RATE: 78 BPM

## 2021-09-27 DIAGNOSIS — F41.1 GAD (GENERALIZED ANXIETY DISORDER): Primary | ICD-10-CM

## 2021-09-27 DIAGNOSIS — F32.0 CURRENT MILD EPISODE OF MAJOR DEPRESSIVE DISORDER WITHOUT PRIOR EPISODE (HCC): ICD-10-CM

## 2021-09-27 PROBLEM — U07.1 COVID-19 VIRUS INFECTION: Status: ACTIVE | Noted: 2021-01-01

## 2021-09-27 PROCEDURE — 99213 OFFICE O/P EST LOW 20 MIN: CPT | Performed by: PHYSICIAN ASSISTANT

## 2021-09-27 RX ORDER — ESCITALOPRAM OXALATE 5 MG/1
5 TABLET ORAL DAILY
Qty: 30 TABLET | Refills: 0 | Status: SHIPPED | OUTPATIENT
Start: 2021-09-27 | End: 2021-10-21 | Stop reason: SDUPTHER

## 2021-09-27 NOTE — PROGRESS NOTES
I, Dr. Shiv Farah, have reviewed the notes, assessments, and/or procedures performed by Zahida GOLDMAN, that occurred within our practice at [facility] location, I concur with her documentation of Brielle Alcocer. I have a current collaborative medical agreement with Zahida GOLDMAN.

## 2021-09-27 NOTE — PROGRESS NOTES
"Chief Complaint  Anxiety    Subjective          Brielle Alcocer presents to [facility]  History of Present Illness  Brielle,\"Hope\" is a 26 year old female who presents with increased anxiety issues for the past 9 months.  States she got Covid in January 2021.  Noticed that her anxiety and depression worsened after this.  Hope states she has had decreased desire to do things.  She has had increased anxiety with some depression symptoms.  Hope states that she feels like her parents \"abandoned her\".  They moved to [place].  She lived with her grandparents for short while.  Since this time she has gotten a roommate and is doing well with her roommate.  Has a job that she likes.  States she is having issues with life changes.  Has been seeing a therapist,\"Lorena\",twice monthly.  Her therapist wanted her to start on medication.  States she thinks she may need to see Lorena more often.  Talks with her friends about her symptoms.  They would like her to see a therapist once weekly.  Has good support with friends.  Denied any suicidal homicidal ideation.  Has not been exercising.  Diet has not been very healthy.  States she still has difficulty with taste and smell from her Covid earlier this year.  Sleep has been slightly decreased.  Last LMP was August 31, 2021.  She is not sexually active.     Objective   Vital Signs:   /80   Pulse 78   Temp 98.4 °F (36.9 °C)   Resp 13   Ht 165.1 cm (65\")   Wt 85.7 kg (189 lb)   SpO2 99%   BMI 31.45 kg/m²     Physical Exam  Vitals and nursing note reviewed.   Constitutional:       Appearance: Normal appearance. She is well-developed, well-groomed and overweight.      Interventions: Face mask in place.   HENT:      Head: Normocephalic and atraumatic.   Neck:      Thyroid: No thyroid mass, thyromegaly or thyroid tenderness.      Trachea: Trachea and phonation normal. No tracheal tenderness.   Cardiovascular:      Rate and Rhythm: Normal rate " "and regular rhythm.      Pulses: Normal pulses.      Heart sounds: Normal heart sounds, S1 normal and S2 normal. No murmur heard.     Pulmonary:      Effort: Pulmonary effort is normal.      Breath sounds: Normal breath sounds and air entry.   Abdominal:      General: Bowel sounds are normal.      Palpations: Abdomen is soft. There is no hepatomegaly.      Tenderness: There is no abdominal tenderness. There is no right CVA tenderness, left CVA tenderness, guarding or rebound. Negative signs include Parmar's sign, Rovsing's sign, McBurney's sign, psoas sign and obturator sign.   Musculoskeletal:      Cervical back: Neck supple.      Right lower leg: No edema.      Left lower leg: No edema.   Skin:     General: Skin is warm and dry.      Capillary Refill: Capillary refill takes less than 2 seconds.   Neurological:      Mental Status: She is alert and oriented to person, place, and time.   Psychiatric:         Attention and Perception: Attention and perception normal.         Mood and Affect: Affect normal. Mood is anxious.         Speech: Speech normal.         Behavior: Behavior normal. Behavior is cooperative.         Thought Content: Thought content normal.         Cognition and Memory: Cognition and memory normal.         Judgment: Judgment normal.     I was wearing a surgical mask and face shield during the entire office visit/encounter.     Result Review :                 Assessment and Plan    Diagnoses and all orders for this visit:    1. ANA (generalized anxiety disorder) (Primary)  -     escitalopram (Lexapro) 5 MG tablet; Take 1 tablet by mouth Daily.  Dispense: 30 tablet; Refill: 0    2. Current mild episode of major depressive disorder without prior episode (CMS/HCC)  -     escitalopram (Lexapro) 5 MG tablet; Take 1 tablet by mouth Daily.  Dispense: 30 tablet; Refill: 0    Brielle,\"Hope\", was seen in office today with new onset generalized anxiety disorder with mild depressive disorder.  Will start " Lexapro 5 mg once daily.  Scheduled appointment in 3 weeks for reevaluation.  She is doing physical appointment in November with Dr. Baez.  Instructed to keep her appointments with her therapist.  She may need to be seen weekly.  She voiced understanding.    Follow Up   Return in about 3 weeks (around 10/18/2021), or depression/anxiety.  Patient was given instructions and counseling regarding her condition or for health maintenance advice. Please see specific information pulled into the AVS if appropriate.     [facility]  [address] [place] [id number]  Dept: [phone]  Dept Fax: [phone]  Loc: [phone]  Loc Fax: [phone]

## 2021-10-21 DIAGNOSIS — F41.1 GAD (GENERALIZED ANXIETY DISORDER): ICD-10-CM

## 2021-10-21 DIAGNOSIS — F32.0 CURRENT MILD EPISODE OF MAJOR DEPRESSIVE DISORDER WITHOUT PRIOR EPISODE (HCC): ICD-10-CM

## 2021-10-21 RX ORDER — ESCITALOPRAM OXALATE 5 MG/1
5 TABLET ORAL DAILY
Qty: 90 TABLET | Refills: 0 | Status: SHIPPED | OUTPATIENT
Start: 2021-10-21 | End: 2021-10-27 | Stop reason: DRUGHIGH

## 2021-10-27 ENCOUNTER — OFFICE VISIT (OUTPATIENT)
Dept: FAMILY MEDICINE CLINIC | Facility: CLINIC | Age: 26
End: 2021-10-27

## 2021-10-27 VITALS
OXYGEN SATURATION: 91 % | DIASTOLIC BLOOD PRESSURE: 62 MMHG | BODY MASS INDEX: 31.82 KG/M2 | RESPIRATION RATE: 14 BRPM | TEMPERATURE: 98 F | WEIGHT: 191 LBS | HEIGHT: 65 IN | HEART RATE: 83 BPM | SYSTOLIC BLOOD PRESSURE: 104 MMHG

## 2021-10-27 DIAGNOSIS — F41.1 GAD (GENERALIZED ANXIETY DISORDER): Primary | ICD-10-CM

## 2021-10-27 PROCEDURE — 99213 OFFICE O/P EST LOW 20 MIN: CPT | Performed by: PHYSICIAN ASSISTANT

## 2021-10-27 RX ORDER — ESCITALOPRAM OXALATE 10 MG/1
10 TABLET ORAL DAILY
Qty: 30 TABLET | Refills: 0 | Status: SHIPPED | OUTPATIENT
Start: 2021-10-27 | End: 2021-11-29 | Stop reason: DRUGHIGH

## 2021-10-27 NOTE — PROGRESS NOTES
"Chief Complaint  Anxiety (Management)    Subjective          Brielle Alcocer presents to [facility]  History of Present Illness  Brielle, \"Hope\", is a 26-year-old female who presents for generalized anxiety disorder.  States she has seen a slight improvement with the Lexapro medication.  She is still feeling anxious though.  Denied any suicidal or homicidal ideation.  Her sleep has improved.  Diet has not been healthy due to her long work hours.  Tends to eat more fast food due to convenience.  Radha has not been exercising.  Has no complaints today.     Objective   Vital Signs:   /62 (BP Location: Left arm, Patient Position: Sitting, Cuff Size: Adult)   Pulse 83   Temp 98 °F (36.7 °C) (Infrared)   Resp 14   Ht 165.1 cm (65\")   Wt 86.6 kg (191 lb)   SpO2 91%   BMI 31.78 kg/m²     Physical Exam  Vitals and nursing note reviewed.   Constitutional:       Appearance: Normal appearance. She is well-developed and well-groomed. She is obese.      Interventions: Face mask in place.   HENT:      Head: Normocephalic and atraumatic.   Neck:      Trachea: Trachea and phonation normal. No tracheal tenderness.   Cardiovascular:      Rate and Rhythm: Normal rate and regular rhythm.      Pulses: Normal pulses.      Heart sounds: Normal heart sounds, S1 normal and S2 normal. No murmur heard.      Pulmonary:      Effort: Pulmonary effort is normal.      Breath sounds: Normal breath sounds and air entry.   Abdominal:      General: Bowel sounds are normal.      Palpations: Abdomen is soft. There is no hepatomegaly.      Tenderness: There is no abdominal tenderness. There is no right CVA tenderness, left CVA tenderness, guarding or rebound. Negative signs include Parmar's sign, Rovsing's sign, McBurney's sign, psoas sign and obturator sign.   Musculoskeletal:      Cervical back: Neck supple.      Right lower leg: No edema.      Left lower leg: No edema.   Skin:     General: Skin is warm and " "dry.      Capillary Refill: Capillary refill takes less than 2 seconds.   Neurological:      Mental Status: She is alert and oriented to person, place, and time.   Psychiatric:         Attention and Perception: Attention and perception normal.         Mood and Affect: Mood and affect normal.         Speech: Speech normal.         Behavior: Behavior normal. Behavior is cooperative.         Thought Content: Thought content normal.         Cognition and Memory: Cognition and memory normal.         Judgment: Judgment normal.     I was wearing a surgical mask and face shield during the entire office visit/encounter.     Result Review :                 Assessment and Plan    Diagnoses and all orders for this visit:    1. ANA (generalized anxiety disorder) (Primary)  -     escitalopram (Lexapro) 10 MG tablet; Take 1 tablet by mouth Daily.  Dispense: 30 tablet; Refill: 0    Brielle, \"Hope\", was seen in office today with chronic and uncontrolled generalized anxiety disorder.  I have increased her Lexapro to 10 mg daily.  This was sent to pharmacy.  Plan to follow-up in 4 weeks.    Follow Up   No follow-ups on file.  Patient was given instructions and counseling regarding her condition or for health maintenance advice. Please see specific information pulled into the AVS if appropriate.     PAZ Fernandez [facility]  [address] [place] [phone]  Dept: [phone]  Dept Fax: [phone]  Loc: [phone]  Loc Fax: [phone]        "

## 2021-11-08 DIAGNOSIS — E55.9 VITAMIN D DEFICIENCY: ICD-10-CM

## 2021-11-08 RX ORDER — ERGOCALCIFEROL 1.25 MG/1
50000 CAPSULE ORAL WEEKLY
Qty: 12 CAPSULE | Refills: 0 | OUTPATIENT
Start: 2021-11-08

## 2021-11-29 ENCOUNTER — OFFICE VISIT (OUTPATIENT)
Dept: FAMILY MEDICINE CLINIC | Facility: CLINIC | Age: 26
End: 2021-11-29

## 2021-11-29 VITALS
RESPIRATION RATE: 15 BRPM | WEIGHT: 188 LBS | TEMPERATURE: 98 F | DIASTOLIC BLOOD PRESSURE: 80 MMHG | HEIGHT: 65 IN | OXYGEN SATURATION: 99 % | HEART RATE: 83 BPM | SYSTOLIC BLOOD PRESSURE: 120 MMHG | BODY MASS INDEX: 31.32 KG/M2

## 2021-11-29 DIAGNOSIS — F41.1 GAD (GENERALIZED ANXIETY DISORDER): Primary | ICD-10-CM

## 2021-11-29 PROCEDURE — 99213 OFFICE O/P EST LOW 20 MIN: CPT | Performed by: PHYSICIAN ASSISTANT

## 2021-11-29 RX ORDER — ESCITALOPRAM OXALATE 20 MG/1
20 TABLET ORAL DAILY
Qty: 30 TABLET | Refills: 0 | Status: SHIPPED | OUTPATIENT
Start: 2021-11-29 | End: 2022-01-03 | Stop reason: SDUPTHER

## 2021-11-29 NOTE — PROGRESS NOTES
"Chief Complaint  Anxiety    Subjective          Brielle Alcocer presents to [facility]  History of Present Illness  Hope is a 26-year-old female who presents for anxiety management.  Seen a slight improvement with the Lexapro 10 mg daily.  Denied any suicidal or homicidal ideation.  Appetite and sleep have been normal.  Review of Systems   Psychiatric/Behavioral: The patient is nervous/anxious.    All other systems reviewed and are negative.    Objective   Vital Signs:   /80   Pulse 83   Temp 98 °F (36.7 °C)   Resp 15   Ht 165.1 cm (65\")   Wt 85.3 kg (188 lb)   SpO2 99%   BMI 31.28 kg/m²     Physical Exam  Vitals and nursing note reviewed.   Constitutional:       Appearance: Normal appearance. She is well-developed and well-groomed. She is obese.      Interventions: Face mask in place.   HENT:      Head: Normocephalic and atraumatic.   Neck:      Thyroid: No thyroid mass, thyromegaly or thyroid tenderness.      Trachea: Trachea and phonation normal. No tracheal tenderness.   Cardiovascular:      Rate and Rhythm: Normal rate and regular rhythm.      Pulses: Normal pulses.      Heart sounds: Normal heart sounds, S1 normal and S2 normal. No murmur heard.      Pulmonary:      Effort: Pulmonary effort is normal.      Breath sounds: Normal breath sounds and air entry.   Abdominal:      General: Bowel sounds are normal.      Palpations: Abdomen is soft. There is no hepatomegaly.      Tenderness: There is no abdominal tenderness. There is no right CVA tenderness, left CVA tenderness, guarding or rebound. Negative signs include Parmar's sign, Rovsing's sign, McBurney's sign, psoas sign and obturator sign.   Musculoskeletal:      Cervical back: Neck supple.      Right lower leg: No edema.      Left lower leg: No edema.   Skin:     General: Skin is warm and dry.      Capillary Refill: Capillary refill takes less than 2 seconds.   Neurological:      Mental Status: She is alert and " oriented to person, place, and time.   Psychiatric:         Attention and Perception: Attention and perception normal.         Mood and Affect: Affect normal. Mood is anxious.         Speech: Speech normal.         Behavior: Behavior normal. Behavior is cooperative.         Thought Content: Thought content normal.         Cognition and Memory: Cognition and memory normal.         Judgment: Judgment normal.     I wore a facial mask, face shield, and gloves during this patient encounter.  Patient also wearing a surgical mask.  Hand hygiene performed before and after seeing the patient.     Result Review :                 Assessment and Plan    Diagnoses and all orders for this visit:    1. ANA (generalized anxiety disorder) (Primary)  -     escitalopram (Lexapro) 20 MG tablet; Take 1 tablet by mouth Daily.  Dispense: 30 tablet; Refill: 0    Hope was seen in office today for chronic and uncontrolled generalized anxiety disorder: I will increase her Lexapro to 20 mg daily.  Will return to office in 3-4 weeks for reevaluation.      Follow Up   Return in about 4 weeks (around 12/27/2021), or anxiety with Dr. Baez.  Patient was given instructions and counseling regarding her condition or for health maintenance advice. Please see specific information pulled into the AVS if appropriate.     [facility]andez [facility]  [address] [place] [id number]  Dept: [phone]  Dept Fax: [phone]  Loc: [phone]  Loc Fax: [phone]

## 2022-01-03 ENCOUNTER — OFFICE VISIT (OUTPATIENT)
Dept: FAMILY MEDICINE CLINIC | Facility: CLINIC | Age: 27
End: 2022-01-03

## 2022-01-03 VITALS
BODY MASS INDEX: 30.99 KG/M2 | HEIGHT: 65 IN | DIASTOLIC BLOOD PRESSURE: 72 MMHG | WEIGHT: 186 LBS | TEMPERATURE: 97.3 F | SYSTOLIC BLOOD PRESSURE: 120 MMHG

## 2022-01-03 DIAGNOSIS — F41.1 GAD (GENERALIZED ANXIETY DISORDER): Primary | ICD-10-CM

## 2022-01-03 PROCEDURE — 99213 OFFICE O/P EST LOW 20 MIN: CPT | Performed by: FAMILY MEDICINE

## 2022-01-03 RX ORDER — ESCITALOPRAM OXALATE 20 MG/1
20 TABLET ORAL DAILY
Qty: 90 TABLET | Refills: 1 | Status: SHIPPED | OUTPATIENT
Start: 2022-01-03 | End: 2022-06-29

## 2022-01-03 NOTE — PROGRESS NOTES
Subjective   Brielle Alcocer is a 26 y.o. female with   Chief Complaint   Patient presents with   • Anxiety     med follow up   .    History of Present Illness   26-year-old white female with known history of general anxiety disorder here for further medical management. Patient has been successfully using Lexapro at 20 mg daily. She uses this product on a regular basis and has seen benefit with controlled anxiety had no evidence of panic since before last visit. This medication was increased to 20 mg by a physician assistant in November 2021. This increased dose has been most beneficial and she would like to continue same. She denies side effects with the above and denies suicidal or homicidal ideation.  The following portions of the patient's history were reviewed and updated as appropriate: allergies, current medications, past family history, past medical history, past social history, past surgical history and problem list.    Review of Systems   Psychiatric/Behavioral: The patient is nervous/anxious.        Objective     Vitals:    01/03/22 1051   BP: 120/72   Temp: 97.3 °F (36.3 °C)       No results found for this or any previous visit (from the past 672 hour(s)).    Physical Exam  Vitals and nursing note reviewed.   Constitutional:       Appearance: Normal appearance. She is well-developed and well-groomed.   HENT:      Head: Normocephalic and atraumatic.   Musculoskeletal:      Cervical back: Neck supple.   Skin:     General: Skin is warm and dry.      Findings: No rash.   Neurological:      Mental Status: She is alert and oriented to person, place, and time.   Psychiatric:         Attention and Perception: Attention and perception normal.         Mood and Affect: Mood and affect normal.         Speech: Speech normal.         Behavior: Behavior normal. Behavior is cooperative.         Thought Content: Thought content normal.         Cognition and Memory: Cognition and memory normal.         Judgment: Judgment  normal.         Assessment/Plan   Diagnoses and all orders for this visit:    1. ANA (generalized anxiety disorder) (Primary)  -     escitalopram (Lexapro) 20 MG tablet; Take 1 tablet by mouth Daily.  Dispense: 90 tablet; Refill: 1        Return in about 6 months (around 7/3/2022) for Recheck.

## 2022-01-18 DIAGNOSIS — F32.0 CURRENT MILD EPISODE OF MAJOR DEPRESSIVE DISORDER WITHOUT PRIOR EPISODE: ICD-10-CM

## 2022-01-18 DIAGNOSIS — F41.1 GAD (GENERALIZED ANXIETY DISORDER): ICD-10-CM

## 2022-01-18 RX ORDER — ESCITALOPRAM OXALATE 5 MG/1
TABLET ORAL
Qty: 90 TABLET | Refills: 0 | OUTPATIENT
Start: 2022-01-18

## 2022-01-18 NOTE — TELEPHONE ENCOUNTER
Notify patient's pharmacy no longer on this medication at this dose.  Please take off automatic refill.

## 2022-06-29 DIAGNOSIS — F41.1 GAD (GENERALIZED ANXIETY DISORDER): ICD-10-CM

## 2022-06-29 RX ORDER — ESCITALOPRAM OXALATE 20 MG/1
TABLET ORAL
Qty: 90 TABLET | Refills: 0 | Status: SHIPPED | OUTPATIENT
Start: 2022-06-29 | End: 2022-11-17

## 2022-07-06 ENCOUNTER — TELEPHONE (OUTPATIENT)
Dept: FAMILY MEDICINE CLINIC | Facility: CLINIC | Age: 27
End: 2022-07-06

## 2022-07-06 NOTE — TELEPHONE ENCOUNTER
Pt will be 6 days our from testing positive by the day of her appt.  Does she have to reschedule or can she keep appointment?

## 2022-07-06 NOTE — TELEPHONE ENCOUNTER
PATIENT CALLED STATING THAT Monday SHE STARTING HAVING COVID SYMPTOMS.     TESTED POSITIVE YESTERDAY MORNING     SYMPTOMS:   CONGESTION   THROAT SORE   COUGH   NO LOSS OR SMELL  TEMP =99.1      PATIENT WANTED TO KNOW IF SHE NEEDS TO CANCEL APPT FOR 7-11    HAS HAD VACCINES AS WELL     PLEASE CALL AND ADVISE     Brielle Alcocer (Self) [phone] (H)

## 2022-07-11 ENCOUNTER — OFFICE VISIT (OUTPATIENT)
Dept: FAMILY MEDICINE CLINIC | Facility: CLINIC | Age: 27
End: 2022-07-11

## 2022-07-11 VITALS
WEIGHT: 180 LBS | HEART RATE: 80 BPM | DIASTOLIC BLOOD PRESSURE: 72 MMHG | SYSTOLIC BLOOD PRESSURE: 118 MMHG | BODY MASS INDEX: 29.99 KG/M2 | TEMPERATURE: 97.1 F | OXYGEN SATURATION: 99 % | HEIGHT: 65 IN

## 2022-07-11 DIAGNOSIS — F41.1 GAD (GENERALIZED ANXIETY DISORDER): Primary | ICD-10-CM

## 2022-07-11 PROCEDURE — 99213 OFFICE O/P EST LOW 20 MIN: CPT | Performed by: FAMILY MEDICINE

## 2022-07-11 RX ORDER — NORETHINDRONE ACETATE AND ETHINYL ESTRADIOL 1MG-20(21)
1 KIT ORAL DAILY
COMMUNITY
Start: 2022-05-09 | End: 2023-01-09

## 2022-07-11 NOTE — PROGRESS NOTES
Subjective   Brielle Alcocer is a 26 y.o. female with   Chief Complaint   Patient presents with   • Depression   • Anxiety   .    History of Present Illness   26-year-old white female with known history of general anxiety disorder here for further medical management.  Patient has been for the most part successfully using Lexapro at 20 mg daily.  She does have episodes of increased anxiety but for the most part this centers around her family.  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  She is comfortable in her position and with her coworkers.  She also has a roommate and has been very comfortable with this individual.  She is dating and is on Junel FE 1/20.  She has been sexually active.  For the most part anxiety has been controlled.  Patient denies suicidal or homicidal ideation.  The following portions of the patient's history were reviewed and updated as appropriate: allergies, current medications, past family history, past medical history, past social history, past surgical history and problem list.    Review of Systems   Psychiatric/Behavioral: The patient is nervous/anxious.        Objective     Vitals:    07/11/22 1356   BP: 118/72   Pulse: 80   Temp: 97.1 °F (36.2 °C)   SpO2: 99%       No results found for this or any previous visit (from the past 672 hour(s)).    Physical Exam  Vitals and nursing note reviewed.   Constitutional:       Appearance: Normal appearance. She is well-developed and well-groomed.   HENT:      Head: Normocephalic and atraumatic.   Musculoskeletal:      Cervical back: Neck supple.   Skin:     General: Skin is warm and dry.      Findings: No rash.   Neurological:      Mental Status: She is alert and oriented to person, place, and time.   Psychiatric:         Attention and Perception: Attention and perception normal.         Mood and Affect: Mood and affect normal.         Speech: Speech normal.         Behavior: Behavior normal. Behavior is cooperative.          Thought Content: Thought content normal.         Cognition and Memory: Cognition and memory normal.         Judgment: Judgment normal.         Assessment & Plan   Diagnoses and all orders for this visit:    1. ANA (generalized anxiety disorder) (Primary)    Lexapro will continue without alteration at this point.  Have discussed the addition of other product such as BuSpar if anxiety begins to breakthrough more frequently.    Return in about 6 months (around 1/11/2023) for Recheck.

## 2022-11-17 DIAGNOSIS — F41.1 GAD (GENERALIZED ANXIETY DISORDER): ICD-10-CM

## 2022-11-17 RX ORDER — ESCITALOPRAM OXALATE 20 MG/1
TABLET ORAL
Qty: 90 TABLET | Refills: 0 | Status: SHIPPED | OUTPATIENT
Start: 2022-11-17 | End: 2023-01-09 | Stop reason: SINTOL

## 2023-01-09 ENCOUNTER — OFFICE VISIT (OUTPATIENT)
Dept: FAMILY MEDICINE CLINIC | Facility: CLINIC | Age: 28
End: 2023-01-09
Payer: MEDICAID

## 2023-01-09 VITALS
WEIGHT: 185 LBS | SYSTOLIC BLOOD PRESSURE: 124 MMHG | TEMPERATURE: 97.5 F | BODY MASS INDEX: 30.82 KG/M2 | HEIGHT: 65 IN | OXYGEN SATURATION: 98 % | HEART RATE: 76 BPM | DIASTOLIC BLOOD PRESSURE: 82 MMHG

## 2023-01-09 DIAGNOSIS — F41.1 GAD (GENERALIZED ANXIETY DISORDER): Primary | ICD-10-CM

## 2023-01-09 PROCEDURE — 99213 OFFICE O/P EST LOW 20 MIN: CPT | Performed by: FAMILY MEDICINE

## 2023-01-09 RX ORDER — ESCITALOPRAM OXALATE 5 MG/1
5 TABLET ORAL DAILY
Qty: 90 TABLET | Refills: 0 | Status: SHIPPED | OUTPATIENT
Start: 2023-01-09

## 2023-01-10 NOTE — PROGRESS NOTES
Subjective   Brielle Alcocer is a 27 y.o. female with   Chief Complaint   Patient presents with   • Anxiety   .    History of Present Illness   27-year-old white female with known history of general anxiety/panic disorder here for further medical management.  At one time patient had been using Lexapro and had titrated up to 20 mg daily.  She however felt this dose was too much having side effects such as lethargy and apathy.  She self discontinued this medication although has seen anxiety creep back into her life now that she is not using anything.  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.  There has been some increase conflict between grandparents and her parents and she finds her self caught in the middle.  This has also been a source of increasing her anxiety.  Patient denies suicidal or homicidal ideation.  She would like to retry Lexapro at 5 mg daily.  The following portions of the patient's history were reviewed and updated as appropriate: allergies, current medications, past family history, past medical history, past social history, past surgical history and problem list.    Review of Systems   Psychiatric/Behavioral: The patient is nervous/anxious.        Objective     Vitals:    01/09/23 1344   BP: 124/82   Pulse: 76   Temp: 97.5 °F (36.4 °C)   SpO2: 98%       No results found for this or any previous visit (from the past 672 hour(s)).    Physical Exam  Vitals and nursing note reviewed.   Constitutional:       Appearance: Normal appearance. She is well-developed and well-groomed.   HENT:      Head: Normocephalic and atraumatic.   Musculoskeletal:      Cervical back: Neck supple.   Skin:     General: Skin is warm and dry.      Findings: No rash.   Neurological:      Mental Status: She is alert and oriented to person, place, and time.   Psychiatric:         Attention and Perception: Attention and perception normal.         Mood and Affect: Mood and affect  normal.         Speech: Speech normal.         Behavior: Behavior normal. Behavior is cooperative.         Thought Content: Thought content normal.         Cognition and Memory: Cognition and memory normal.         Judgment: Judgment normal.         Assessment & Plan   Diagnoses and all orders for this visit:    1. ANA (generalized anxiety disorder) (Primary)  -     escitalopram (Lexapro) 5 MG tablet; Take 1 tablet by mouth Daily.  Dispense: 90 tablet; Refill: 0        Return in about 3 months (around 4/9/2023) for Recheck.

## 2023-05-08 ENCOUNTER — OFFICE VISIT (OUTPATIENT)
Dept: FAMILY MEDICINE CLINIC | Facility: CLINIC | Age: 28
End: 2023-05-08
Payer: MEDICAID

## 2023-05-08 VITALS
DIASTOLIC BLOOD PRESSURE: 74 MMHG | SYSTOLIC BLOOD PRESSURE: 118 MMHG | WEIGHT: 184 LBS | HEART RATE: 96 BPM | TEMPERATURE: 98.1 F | OXYGEN SATURATION: 99 % | BODY MASS INDEX: 30.66 KG/M2 | HEIGHT: 65 IN

## 2023-05-08 DIAGNOSIS — F41.1 GAD (GENERALIZED ANXIETY DISORDER): Primary | ICD-10-CM

## 2023-05-08 PROCEDURE — 99213 OFFICE O/P EST LOW 20 MIN: CPT | Performed by: FAMILY MEDICINE

## 2023-05-08 PROCEDURE — 1160F RVW MEDS BY RX/DR IN RCRD: CPT | Performed by: FAMILY MEDICINE

## 2023-05-08 PROCEDURE — 1159F MED LIST DOCD IN RCRD: CPT | Performed by: FAMILY MEDICINE

## 2023-05-08 RX ORDER — ESCITALOPRAM OXALATE 10 MG/1
10 TABLET ORAL DAILY
Qty: 90 TABLET | Refills: 0 | Status: SHIPPED | OUTPATIENT
Start: 2023-05-08

## 2023-05-09 NOTE — PROGRESS NOTES
Subjective   Brielle Alcocer is a 27 y.o. female with   Chief Complaint   Patient presents with   • Anxiety   .    History of Present Illness   27-year-old white female with generalized anxiety/panic disorder here for further medical management.  Patient was started on Lexapro at 5 mg daily at last visit.  She has been able to tolerate this product and notes no side effects.  She does state that she has a much more generalized calmness than previous.  She is able to tolerate usual stressors in life and has been functioning well both at work and in social environments with her boyfriend.  Patient denies suicidal or homicidal ideation.  There is not been a panic attack since before last visit.  The following portions of the patient's history were reviewed and updated as appropriate: allergies, current medications, past family history, past medical history, past social history, past surgical history and problem list.    Review of Systems   Psychiatric/Behavioral: The patient is nervous/anxious.        Objective     Vitals:    05/08/23 1454   BP: 118/74   Pulse: 96   Temp: 98.1 °F (36.7 °C)   SpO2: 99%       No results found for this or any previous visit (from the past 672 hour(s)).    Physical Exam  Vitals and nursing note reviewed.   Constitutional:       Appearance: Normal appearance. She is well-developed and well-groomed.   HENT:      Head: Normocephalic and atraumatic.   Musculoskeletal:      Cervical back: Neck supple.   Skin:     General: Skin is warm and dry.      Findings: No rash.   Neurological:      Mental Status: She is alert and oriented to person, place, and time.   Psychiatric:         Attention and Perception: Attention and perception normal.         Mood and Affect: Mood and affect normal.         Speech: Speech normal.         Behavior: Behavior normal. Behavior is cooperative.         Thought Content: Thought content normal.         Cognition and Memory: Cognition and memory normal.          Judgment: Judgment normal.         Assessment & Plan   Diagnoses and all orders for this visit:    1. ANA (generalized anxiety disorder) (Primary)  -     escitalopram (Lexapro) 10 MG tablet; Take 1 tablet by mouth Daily.  Dispense: 90 tablet; Refill: 0        Return in about 3 months (around 8/8/2023) for Recheck.

## 2023-08-11 ENCOUNTER — OFFICE VISIT (OUTPATIENT)
Dept: FAMILY MEDICINE CLINIC | Facility: CLINIC | Age: 28
End: 2023-08-11
Payer: MEDICAID

## 2023-08-11 VITALS
SYSTOLIC BLOOD PRESSURE: 122 MMHG | WEIGHT: 187 LBS | OXYGEN SATURATION: 100 % | HEART RATE: 97 BPM | DIASTOLIC BLOOD PRESSURE: 80 MMHG | HEIGHT: 65 IN | TEMPERATURE: 97.7 F | BODY MASS INDEX: 31.16 KG/M2 | RESPIRATION RATE: 14 BRPM

## 2023-08-11 DIAGNOSIS — J02.9 PHARYNGITIS, UNSPECIFIED ETIOLOGY: Primary | ICD-10-CM

## 2023-08-11 DIAGNOSIS — R59.1 LYMPHADENOPATHY: ICD-10-CM

## 2023-08-11 DIAGNOSIS — R53.83 FATIGUE, UNSPECIFIED TYPE: ICD-10-CM

## 2023-08-11 LAB
EXPIRATION DATE: NORMAL
INTERNAL CONTROL: NORMAL
Lab: NORMAL
S PYO AG THROAT QL: NEGATIVE

## 2023-08-11 PROCEDURE — 87880 STREP A ASSAY W/OPTIC: CPT | Performed by: PHYSICIAN ASSISTANT

## 2023-08-11 PROCEDURE — 99214 OFFICE O/P EST MOD 30 MIN: CPT | Performed by: PHYSICIAN ASSISTANT

## 2023-08-11 RX ORDER — AZITHROMYCIN 250 MG/1
TABLET, FILM COATED ORAL
Qty: 6 TABLET | Refills: 0 | Status: SHIPPED | OUTPATIENT
Start: 2023-08-11

## 2023-08-11 NOTE — PROGRESS NOTES
"Chief Complaint  Fever (Running high at night , highest has been 102.4 since Monday ), Fatigue, Generalized Body Aches, Chills, and Sore Throat (Since yesterday - did vomit two nights ago )    Subjective          Brielle Alcocer presents to [facility]  History of Present Illness  Brielle,\"Radha\" is a 27 year old female who presents with new sore throat, fevers, body aches and headache.  States she started having a migraine headache on Sunday, August 6, 2023.  By Monday she felt dizzy was slightly nauseated and was running a fever.  By the next day she had a headache.  Fevers have been averaging around 101-102.4.  States on Tuesday she made herself vomit.  She had taken 2 COVID test at home which have been negative.  Denied any recent sick contacts.  She noticed yesterday that the throat was worsening with swollen glands.  Headache has been improving.  Denied any nausea, vomiting, diarrhea, sinus pressure, rhinorrhea, ear pain or rashes.  She has been fatigued as well.  Appetite and sleep have been decreased.     Objective   Vital Signs:   /80 (BP Location: Left arm, Patient Position: Sitting, Cuff Size: Adult)   Pulse 97   Temp 97.7 øF (36.5 øC)   Resp 14   Ht 165.1 cm (65\")   Wt 84.8 kg (187 lb)   SpO2 100%   BMI 31.12 kg/mý     Patient's last menstrual period was 08/02/2023 (exact date).    Physical Exam  Vitals and nursing note reviewed.   Constitutional:       Appearance: Normal appearance. She is well-developed and well-groomed. She is obese.   HENT:      Head: Normocephalic and atraumatic.      Jaw: There is normal jaw occlusion.      Right Ear: Hearing, tympanic membrane, ear canal and external ear normal.      Left Ear: Hearing, tympanic membrane, ear canal and external ear normal.      Nose: Nose normal.      Right Sinus: No maxillary sinus tenderness or frontal sinus tenderness.      Left Sinus: No maxillary sinus tenderness or frontal sinus tenderness.      " Mouth/Throat:      Lips: Pink.      Mouth: Mucous membranes are moist.      Dentition: Normal dentition.      Tongue: No lesions.      Pharynx: Oropharynx is clear. Uvula midline. Pharyngeal swelling and posterior oropharyngeal erythema present. No uvula swelling.      Tonsils: Tonsillar exudate present. 1+ on the right. 1+ on the left.   Eyes:      General: Lids are normal.      Conjunctiva/sclera: Conjunctivae normal.      Pupils: Pupils are equal, round, and reactive to light.   Neck:      Trachea: Trachea and phonation normal. No tracheal tenderness.   Cardiovascular:      Rate and Rhythm: Normal rate and regular rhythm.      Pulses: Normal pulses.      Heart sounds: Normal heart sounds, S1 normal and S2 normal. No murmur heard.  Pulmonary:      Effort: Pulmonary effort is normal.      Breath sounds: Normal breath sounds and air entry.   Abdominal:      General: Bowel sounds are normal.      Palpations: Abdomen is soft.      Tenderness: There is no abdominal tenderness. There is no right CVA tenderness, left CVA tenderness, guarding or rebound. Negative signs include Parmar's sign, Rovsing's sign, McBurney's sign, psoas sign and obturator sign.   Musculoskeletal:      Cervical back: Neck supple.      Right lower leg: No edema.      Left lower leg: No edema.   Lymphadenopathy:      Cervical: Cervical adenopathy (2+) present.      Right cervical: Posterior cervical adenopathy present. No superficial or deep cervical adenopathy.     Left cervical: Posterior cervical adenopathy present. No superficial or deep cervical adenopathy.   Skin:     General: Skin is warm and dry.      Capillary Refill: Capillary refill takes less than 2 seconds.      Findings: No rash.   Neurological:      Mental Status: She is alert and oriented to person, place, and time.   Psychiatric:         Attention and Perception: Attention and perception normal.         Mood and Affect: Mood and affect normal.         Speech: Speech normal.          "Behavior: Behavior is cooperative.         Thought Content: Thought content normal.         Cognition and Memory: Cognition and memory normal.         Judgment: Judgment normal.        I wore a facial mask and gloves  during this patient encounter.  Patient also wearing a surgical mask.  Hand hygiene performed before and after seeing the patient.    Result Review :       Results for orders placed or performed in visit on 08/11/23   POC Rapid Strep A    Specimen: Swab   Result Value Ref Range    Rapid Strep A Screen Negative Negative, VALID, INVALID, Not Performed    Internal Control Passed Passed    Lot Number [id number]     Expiration Date 10-29-24                  Assessment and Plan    Diagnoses and all orders for this visit:    1. Pharyngitis, unspecified etiology (Primary)  -     CBC & Differential  -     Comprehensive Metabolic Panel  -     EBV Antibody Profile  -     azithromycin (Zithromax Z-Earl) 250 MG tablet; Take 2 tablets by mouth on day 1, then 1 tablet daily on days 2-5  Dispense: 6 tablet; Refill: 0  -     POC Rapid Strep A    2. Lymphadenopathy  -     CBC & Differential  -     Comprehensive Metabolic Panel  -     EBV Antibody Profile  -     azithromycin (Zithromax Z-Earl) 250 MG tablet; Take 2 tablets by mouth on day 1, then 1 tablet daily on days 2-5  Dispense: 6 tablet; Refill: 0  -     POC Rapid Strep A    3. Fatigue, unspecified type  -     CBC & Differential  -     Comprehensive Metabolic Panel  -     EBV Antibody Profile  -     azithromycin (Zithromax Z-Earl) 250 MG tablet; Take 2 tablets by mouth on day 1, then 1 tablet daily on days 2-5  Dispense: 6 tablet; Refill: 0  -     POC Rapid Strep A    Brielle, \"Hope\", was seen in office today with any pharyngitis with lymphadenopathy and fatigue.  In office strep test was negative.  She will have a CBC, CMP and Tanner-Barr virus collected today to rule out mononucleosis.  Have prescribed a Z-Earl to pharmacy.  Continue Tylenol/ibuprofen as needed for " fevers and body aches.  Avoid acidic foods and change toothbrush after being on antibiotic for 24 hours.  Increase fluid intake and rest.  She will be notified of test results when completed.    I spent 18 minutes caring for Brielle on this date of service. This time includes time spent by me in the following activities:preparing for the visit, reviewing tests, obtaining and/or reviewing a separately obtained history, performing a medically appropriate examination and/or evaluation , counseling and educating the patient/family/caregiver, ordering medications, tests, or procedures, and documenting information in the medical record  Follow Up   Return if symptoms worsen or fail to improve.  Patient was given instructions and counseling regarding her condition or for health maintenance advice. Please see specific information pulled into the AVS if appropriate.     PAZ Fernandez [facility]  [address] [place] [id number]  Dept: [phone]  Dept Fax: [phone]  Loc: [phone]  Loc Fax: [phone]

## 2023-08-14 LAB
ALBUMIN SERPL-MCNC: 3.9 G/DL (ref 3.5–5.2)
ALBUMIN/GLOB SERPL: 2.1 G/DL
ALP SERPL-CCNC: 157 U/L (ref 39–117)
ALT SERPL-CCNC: 148 U/L (ref 1–33)
AST SERPL-CCNC: 139 U/L (ref 1–32)
BASOPHILS # BLD AUTO: NORMAL 10*3/UL
BASOPHILS # BLD MANUAL: 0 10*3/MM3 (ref 0–0.2)
BASOPHILS NFR BLD MANUAL: 0 % (ref 0–1.5)
BILIRUB SERPL-MCNC: 0.7 MG/DL (ref 0–1.2)
BUN SERPL-MCNC: 8 MG/DL (ref 6–20)
BUN/CREAT SERPL: 11.1 (ref 7–25)
CALCIUM SERPL-MCNC: 9 MG/DL (ref 8.6–10.5)
CHLORIDE SERPL-SCNC: 104 MMOL/L (ref 98–107)
CO2 SERPL-SCNC: 23.5 MMOL/L (ref 22–29)
CREAT SERPL-MCNC: 0.72 MG/DL (ref 0.57–1)
DIFFERENTIAL COMMENT: ABNORMAL
EBV NA IGG SER IA-ACNC: <18 U/ML (ref 0–17.9)
EBV VCA IGG SER IA-ACNC: <18 U/ML (ref 0–17.9)
EBV VCA IGM SER IA-ACNC: <36 U/ML (ref 0–35.9)
EGFRCR SERPLBLD CKD-EPI 2021: 117 ML/MIN/1.73
EOSINOPHIL # BLD AUTO: NORMAL 10*3/UL
EOSINOPHIL # BLD MANUAL: 0 10*3/MM3 (ref 0–0.4)
EOSINOPHIL NFR BLD AUTO: NORMAL %
EOSINOPHIL NFR BLD MANUAL: 0 % (ref 0.3–6.2)
ERYTHROCYTE [DISTWIDTH] IN BLOOD BY AUTOMATED COUNT: 12.9 % (ref 12.3–15.4)
GLOBULIN SER CALC-MCNC: 1.9 GM/DL
GLUCOSE SERPL-MCNC: 70 MG/DL (ref 65–99)
HCT VFR BLD AUTO: 40 % (ref 34–46.6)
HGB BLD-MCNC: 13.6 G/DL (ref 12–15.9)
LYMPHOCYTES # BLD AUTO: NORMAL 10*3/UL
LYMPHOCYTES # BLD MANUAL: 1.98 10*3/MM3 (ref 0.7–3.1)
LYMPHOCYTES NFR BLD AUTO: NORMAL %
LYMPHOCYTES NFR BLD MANUAL: 42 % (ref 19.6–45.3)
MCH RBC QN AUTO: 28.3 PG (ref 26.6–33)
MCHC RBC AUTO-ENTMCNC: 34 G/DL (ref 31.5–35.7)
MCV RBC AUTO: 83.3 FL (ref 79–97)
MONOCYTES # BLD MANUAL: 0.38 10*3/MM3 (ref 0.1–0.9)
MONOCYTES NFR BLD AUTO: NORMAL %
MONOCYTES NFR BLD MANUAL: 8 % (ref 5–12)
NEUTROPHILS # BLD MANUAL: 2.36 10*3/MM3 (ref 1.7–7)
NEUTROPHILS NFR BLD AUTO: NORMAL %
NEUTROPHILS NFR BLD MANUAL: 50 % (ref 42.7–76)
PLATELET # BLD AUTO: 165 10*3/MM3 (ref 140–450)
PLATELET BLD QL SMEAR: ABNORMAL
POTASSIUM SERPL-SCNC: 4.3 MMOL/L (ref 3.5–5.2)
PROT SERPL-MCNC: 5.8 G/DL (ref 6–8.5)
RBC # BLD AUTO: 4.8 10*6/MM3 (ref 3.77–5.28)
RBC MORPH BLD: ABNORMAL
SERVICE CMNT-IMP: NORMAL
SODIUM SERPL-SCNC: 140 MMOL/L (ref 136–145)
WBC # BLD AUTO: 4.71 10*3/MM3 (ref 3.4–10.8)

## 2023-08-15 LAB
HAV IGM SERPL QL IA: NEGATIVE
HBV CORE IGM SERPL QL IA: NEGATIVE
HBV SURFACE AG SERPL QL IA: NEGATIVE
HCV AB SERPL QL IA: NORMAL
HCV IGG SERPL QL IA: NON REACTIVE
WRITTEN AUTHORIZATION: NORMAL

## 2023-08-16 ENCOUNTER — LAB (OUTPATIENT)
Dept: LAB | Facility: HOSPITAL | Age: 28
End: 2023-08-16
Payer: MEDICAID

## 2023-08-16 ENCOUNTER — HOSPITAL ENCOUNTER (OUTPATIENT)
Dept: CT IMAGING | Facility: HOSPITAL | Age: 28
Discharge: HOME | End: 2023-08-16
Payer: MEDICAID

## 2023-08-16 DIAGNOSIS — R11.2 NAUSEA AND VOMITING, UNSPECIFIED VOMITING TYPE: ICD-10-CM

## 2023-08-16 DIAGNOSIS — R17 JAUNDICE: ICD-10-CM

## 2023-08-16 DIAGNOSIS — R10.84 GENERALIZED ABDOMINAL PAIN: Primary | ICD-10-CM

## 2023-08-16 DIAGNOSIS — R79.89 ELEVATED LIVER FUNCTION TESTS: Primary | ICD-10-CM

## 2023-08-16 DIAGNOSIS — R79.89 ELEVATED LIVER FUNCTION TESTS: ICD-10-CM

## 2023-08-16 DIAGNOSIS — R10.84 GENERALIZED ABDOMINAL PAIN: ICD-10-CM

## 2023-08-16 LAB
ALBUMIN SERPL-MCNC: 3.8 G/DL (ref 3.5–5.2)
ALBUMIN/GLOB SERPL: 1.2 G/DL
ALP SERPL-CCNC: 397 U/L (ref 39–117)
ALT SERPL W P-5'-P-CCNC: 638 U/L (ref 1–33)
ANION GAP SERPL CALCULATED.3IONS-SCNC: 15.2 MMOL/L (ref 5–15)
AST SERPL-CCNC: 491 U/L (ref 1–32)
BILIRUB SERPL-MCNC: 2.2 MG/DL (ref 0–1.2)
BUN SERPL-MCNC: 4 MG/DL (ref 6–20)
BUN/CREAT SERPL: 6.1 (ref 7–25)
CALCIUM SPEC-SCNC: 9.1 MG/DL (ref 8.6–10.5)
CHLORIDE SERPL-SCNC: 99 MMOL/L (ref 98–107)
CO2 SERPL-SCNC: 23.8 MMOL/L (ref 22–29)
CREAT SERPL-MCNC: 0.66 MG/DL (ref 0.57–1)
DEPRECATED RDW RBC AUTO: 38.4 FL (ref 37–54)
EGFRCR SERPLBLD CKD-EPI 2021: 122.7 ML/MIN/1.73
ERYTHROCYTE [DISTWIDTH] IN BLOOD BY AUTOMATED COUNT: 13 % (ref 12.3–15.4)
GLOBULIN UR ELPH-MCNC: 3.1 GM/DL
GLUCOSE SERPL-MCNC: 73 MG/DL (ref 65–99)
HCT VFR BLD AUTO: 40.5 % (ref 34–46.6)
HGB BLD-MCNC: 13.6 G/DL (ref 12–15.9)
MCH RBC QN AUTO: 27.6 PG (ref 26.6–33)
MCHC RBC AUTO-ENTMCNC: 33.6 G/DL (ref 31.5–35.7)
MCV RBC AUTO: 82.2 FL (ref 79–97)
PLATELET # BLD AUTO: 179 10*3/MM3 (ref 140–450)
PMV BLD AUTO: 12 FL (ref 6–12)
POTASSIUM SERPL-SCNC: 3.8 MMOL/L (ref 3.5–5.2)
PROT SERPL-MCNC: 6.9 G/DL (ref 6–8.5)
RBC # BLD AUTO: 4.93 10*6/MM3 (ref 3.77–5.28)
SODIUM SERPL-SCNC: 138 MMOL/L (ref 136–145)
WBC NRBC COR # BLD: 10.37 10*3/MM3 (ref 3.4–10.8)

## 2023-08-16 PROCEDURE — 25510000001 IOPAMIDOL 61 % SOLUTION: Performed by: PHYSICIAN ASSISTANT

## 2023-08-16 PROCEDURE — 80053 COMPREHEN METABOLIC PANEL: CPT | Performed by: PHYSICIAN ASSISTANT

## 2023-08-16 PROCEDURE — 85027 COMPLETE CBC AUTOMATED: CPT | Performed by: PHYSICIAN ASSISTANT

## 2023-08-16 PROCEDURE — 74177 CT ABD & PELVIS W/CONTRAST: CPT

## 2023-08-16 PROCEDURE — 36415 COLL VENOUS BLD VENIPUNCTURE: CPT | Performed by: PHYSICIAN ASSISTANT

## 2023-08-16 RX ADMIN — IOPAMIDOL 85 ML: 612 INJECTION, SOLUTION INTRAVENOUS at 13:51

## 2023-08-16 NOTE — NURSING NOTE
1355: Patient arrived to triage bay 6 for stat hold and call.    1428: Call placed to Zahida Nagy with imaging results. Patient spoke to provider on triage phone and was given results and told to go to outpatient lab for lab work.    1435: Patient taken to outpatient lab for lab work. Left IV in place to draw blood from.

## 2023-08-17 ENCOUNTER — APPOINTMENT (OUTPATIENT)
Dept: ULTRASOUND IMAGING | Facility: HOSPITAL | Age: 28
End: 2023-08-17
Payer: MEDICAID

## 2023-08-17 ENCOUNTER — HOSPITAL ENCOUNTER (EMERGENCY)
Facility: HOSPITAL | Age: 28
Discharge: HOME | End: 2023-08-17
Attending: EMERGENCY MEDICINE
Payer: MEDICAID

## 2023-08-17 VITALS
HEART RATE: 96 BPM | TEMPERATURE: 97.8 F | SYSTOLIC BLOOD PRESSURE: 123 MMHG | OXYGEN SATURATION: 96 % | DIASTOLIC BLOOD PRESSURE: 101 MMHG | HEIGHT: 65 IN | RESPIRATION RATE: 18 BRPM | BODY MASS INDEX: 31.16 KG/M2 | WEIGHT: 187 LBS

## 2023-08-17 DIAGNOSIS — R79.89 ELEVATED LIVER FUNCTION TESTS: Primary | ICD-10-CM

## 2023-08-17 DIAGNOSIS — R17 JAUNDICE: ICD-10-CM

## 2023-08-17 DIAGNOSIS — R11.2 NAUSEA AND VOMITING, UNSPECIFIED VOMITING TYPE: ICD-10-CM

## 2023-08-17 DIAGNOSIS — R74.01 TRANSAMINITIS: ICD-10-CM

## 2023-08-17 DIAGNOSIS — R10.84 GENERALIZED ABDOMINAL PAIN: ICD-10-CM

## 2023-08-17 DIAGNOSIS — B27.00 EBV POSITIVE MONONUCLEOSIS SYNDROME: Primary | ICD-10-CM

## 2023-08-17 LAB
ALBUMIN SERPL-MCNC: 3.9 G/DL (ref 3.5–5.2)
ALBUMIN/GLOB SERPL: 1.6 G/DL
ALP SERPL-CCNC: 406 U/L (ref 39–117)
ALPHA1 GLOB MFR UR ELPH: 199 MG/DL (ref 90–200)
ALT SERPL W P-5'-P-CCNC: 549 U/L (ref 1–33)
ANION GAP SERPL CALCULATED.3IONS-SCNC: 11 MMOL/L (ref 5–15)
APAP SERPL-MCNC: <5 MCG/ML (ref 0–30)
AST SERPL-CCNC: 339 U/L (ref 1–32)
BILIRUB SERPL-MCNC: 1.5 MG/DL (ref 0–1.2)
BILIRUB UR QL STRIP: NEGATIVE
BUN SERPL-MCNC: 4 MG/DL (ref 6–20)
BUN/CREAT SERPL: 6.2 (ref 7–25)
CALCIUM SPEC-SCNC: 8.8 MG/DL (ref 8.6–10.5)
CERULOPLASMIN SERPL-MCNC: 47 MG/DL (ref 19–39)
CHLORIDE SERPL-SCNC: 102 MMOL/L (ref 98–107)
CLARITY UR: CLEAR
CO2 SERPL-SCNC: 25 MMOL/L (ref 22–29)
COLOR UR: ABNORMAL
CREAT SERPL-MCNC: 0.65 MG/DL (ref 0.57–1)
DEPRECATED RDW RBC AUTO: 39.4 FL (ref 37–54)
EGFRCR SERPLBLD CKD-EPI 2021: 123.2 ML/MIN/1.73
ERYTHROCYTE [DISTWIDTH] IN BLOOD BY AUTOMATED COUNT: 13.1 % (ref 12.3–15.4)
ETHANOL BLD-MCNC: <10 MG/DL (ref 0–10)
ETHANOL UR QL: <0.01 %
GGT SERPL-CCNC: 192 U/L (ref 5–36)
GLOBULIN UR ELPH-MCNC: 2.4 GM/DL
GLUCOSE SERPL-MCNC: 93 MG/DL (ref 65–99)
GLUCOSE UR STRIP-MCNC: NEGATIVE MG/DL
HCG SERPL QL: NEGATIVE
HCT VFR BLD AUTO: 38.2 % (ref 34–46.6)
HETEROPH AB SER QL LA: POSITIVE
HGB BLD-MCNC: 13 G/DL (ref 12–15.9)
HGB UR QL STRIP.AUTO: NEGATIVE
INR PPP: 0.98 (ref 0.9–1.1)
IRON 24H UR-MRATE: 57 MCG/DL (ref 37–145)
IRON SATN MFR SERPL: 13 % (ref 20–50)
KETONES UR QL STRIP: ABNORMAL
LEUKOCYTE ESTERASE UR QL STRIP.AUTO: NEGATIVE
LIPASE SERPL-CCNC: 18 U/L (ref 13–60)
LYMPHOCYTES # BLD MANUAL: 6.28 10*3/MM3 (ref 0.7–3.1)
LYMPHOCYTES NFR BLD MANUAL: 4 % (ref 5–12)
MCH RBC QN AUTO: 28.1 PG (ref 26.6–33)
MCHC RBC AUTO-ENTMCNC: 34 G/DL (ref 31.5–35.7)
MCV RBC AUTO: 82.5 FL (ref 79–97)
MONOCYTES # BLD: 0.39 10*3/MM3 (ref 0.1–0.9)
NEUTROPHILS # BLD AUTO: 3.14 10*3/MM3 (ref 1.7–7)
NEUTROPHILS NFR BLD MANUAL: 32 % (ref 42.7–76)
NITRITE UR QL STRIP: NEGATIVE
PH UR STRIP.AUTO: 5.5 [PH] (ref 5–8)
PLAT MORPH BLD: NORMAL
PLATELET # BLD AUTO: 174 10*3/MM3 (ref 140–450)
PMV BLD AUTO: 11.2 FL (ref 6–12)
POTASSIUM SERPL-SCNC: 3.7 MMOL/L (ref 3.5–5.2)
PROT SERPL-MCNC: 6.3 G/DL (ref 6–8.5)
PROT UR QL STRIP: ABNORMAL
PROTHROMBIN TIME: 13.1 SECONDS (ref 11.7–14.2)
RBC # BLD AUTO: 4.63 10*6/MM3 (ref 3.77–5.28)
RBC MORPH BLD: NORMAL
SALICYLATES SERPL-MCNC: <0.3 MG/DL
SODIUM SERPL-SCNC: 138 MMOL/L (ref 136–145)
SP GR UR STRIP: 1.02 (ref 1–1.03)
TIBC SERPL-MCNC: 425 MCG/DL (ref 298–536)
TRANSFERRIN SERPL-MCNC: 285 MG/DL (ref 200–360)
UROBILINOGEN UR QL STRIP: ABNORMAL
VARIANT LYMPHS NFR BLD MANUAL: 64 % (ref 19.6–45.3)
WBC MORPH BLD: NORMAL
WBC NRBC COR # BLD: 9.81 10*3/MM3 (ref 3.4–10.8)

## 2023-08-17 PROCEDURE — 86258 DGP ANTIBODY EACH IG CLASS: CPT | Performed by: NURSE PRACTITIONER

## 2023-08-17 PROCEDURE — 84466 ASSAY OF TRANSFERRIN: CPT | Performed by: NURSE PRACTITIONER

## 2023-08-17 PROCEDURE — 82077 ASSAY SPEC XCP UR&BREATH IA: CPT | Performed by: EMERGENCY MEDICINE

## 2023-08-17 PROCEDURE — 80143 DRUG ASSAY ACETAMINOPHEN: CPT | Performed by: EMERGENCY MEDICINE

## 2023-08-17 PROCEDURE — 86235 NUCLEAR ANTIGEN ANTIBODY: CPT | Performed by: NURSE PRACTITIONER

## 2023-08-17 PROCEDURE — 82103 ALPHA-1-ANTITRYPSIN TOTAL: CPT | Performed by: NURSE PRACTITIONER

## 2023-08-17 PROCEDURE — 86645 CMV ANTIBODY IGM: CPT | Performed by: EMERGENCY MEDICINE

## 2023-08-17 PROCEDURE — 85007 BL SMEAR W/DIFF WBC COUNT: CPT | Performed by: EMERGENCY MEDICINE

## 2023-08-17 PROCEDURE — 83540 ASSAY OF IRON: CPT | Performed by: NURSE PRACTITIONER

## 2023-08-17 PROCEDURE — 85025 COMPLETE CBC W/AUTO DIFF WBC: CPT | Performed by: EMERGENCY MEDICINE

## 2023-08-17 PROCEDURE — 76705 ECHO EXAM OF ABDOMEN: CPT

## 2023-08-17 PROCEDURE — 86225 DNA ANTIBODY NATIVE: CPT | Performed by: NURSE PRACTITIONER

## 2023-08-17 PROCEDURE — 82977 ASSAY OF GGT: CPT | Performed by: NURSE PRACTITIONER

## 2023-08-17 PROCEDURE — 80053 COMPREHEN METABOLIC PANEL: CPT | Performed by: EMERGENCY MEDICINE

## 2023-08-17 PROCEDURE — 85610 PROTHROMBIN TIME: CPT | Performed by: EMERGENCY MEDICINE

## 2023-08-17 PROCEDURE — 82390 ASSAY OF CERULOPLASMIN: CPT | Performed by: NURSE PRACTITIONER

## 2023-08-17 PROCEDURE — 86231 EMA EACH IG CLASS: CPT | Performed by: NURSE PRACTITIONER

## 2023-08-17 PROCEDURE — 86364 TISS TRNSGLTMNASE EA IG CLAS: CPT | Performed by: NURSE PRACTITIONER

## 2023-08-17 PROCEDURE — 83690 ASSAY OF LIPASE: CPT | Performed by: EMERGENCY MEDICINE

## 2023-08-17 PROCEDURE — 86015 ACTIN ANTIBODY EACH: CPT | Performed by: NURSE PRACTITIONER

## 2023-08-17 PROCEDURE — 81003 URINALYSIS AUTO W/O SCOPE: CPT | Performed by: EMERGENCY MEDICINE

## 2023-08-17 PROCEDURE — 82784 ASSAY IGA/IGD/IGG/IGM EACH: CPT | Performed by: NURSE PRACTITIONER

## 2023-08-17 PROCEDURE — 99284 EMERGENCY DEPT VISIT MOD MDM: CPT

## 2023-08-17 PROCEDURE — 80179 DRUG ASSAY SALICYLATE: CPT | Performed by: EMERGENCY MEDICINE

## 2023-08-17 PROCEDURE — 86308 HETEROPHILE ANTIBODY SCREEN: CPT | Performed by: EMERGENCY MEDICINE

## 2023-08-17 PROCEDURE — 84703 CHORIONIC GONADOTROPIN ASSAY: CPT | Performed by: EMERGENCY MEDICINE

## 2023-08-17 PROCEDURE — 86381 MITOCHONDRIAL ANTIBODY EACH: CPT | Performed by: NURSE PRACTITIONER

## 2023-08-17 PROCEDURE — 99284 EMERGENCY DEPT VISIT MOD MDM: CPT | Performed by: NURSE PRACTITIONER

## 2023-08-17 PROCEDURE — 86644 CMV ANTIBODY: CPT | Performed by: EMERGENCY MEDICINE

## 2023-08-17 RX ORDER — SODIUM CHLORIDE 0.9 % (FLUSH) 0.9 %
10 SYRINGE (ML) INJECTION AS NEEDED
Status: DISCONTINUED | OUTPATIENT
Start: 2023-08-17 | End: 2023-08-17 | Stop reason: HOSPADM

## 2023-08-17 NOTE — ED PROVIDER NOTES
EMERGENCY DEPARTMENT ENCOUNTER    Room Number:  40/40  PCP: Cristobal Baez DO      HPI:  Chief Complaint: Abnormal LFTs  A complete HPI/ROS/PMH/PSH/SH/FH are unobtainable due to: None  Context: Brielle Alcocer is a 27 y.o. female who presents to the ED c/o abnormal LFTs.  Patient states that she went to see her PCP 6 days ago for sore throat and fever.  At that time she was found to have elevated LFTs.  She was then sent for follow-up yesterday to have a CT scan of her abdomen pelvis.  Due to increasing LFTs, she came to the emergency department today.  She reports having an ongoing sore throat.  Last emesis was Monday.  She has mild upper abdominal pain.  No tick exposures.  She is an indoor cat only.  She took ibuprofen last week for her fever.  Does not take Tylenol regularly.  She states that she drinks socially only and will have at most 3 white claws when she is with her friends.          PAST MEDICAL HISTORY  Active Ambulatory Problems     Diagnosis Date Noted    Peripheral arterial disease 07/03/2016    Venous insufficiency 07/03/2016    Acne vulgaris 01/30/2018    Chondromalacia, patella, left 01/30/2018    Vitamin D deficiency 08/19/2021    COVID-19 virus infection 01/2021    ANA (generalized anxiety disorder) 10/27/2021     Resolved Ambulatory Problems     Diagnosis Date Noted    No Resolved Ambulatory Problems     Past Medical History:   Diagnosis Date    Allergic     Anxiety     Depression          PAST SURGICAL HISTORY  Past Surgical History:   Procedure Laterality Date    FINGER FRACTURE SURGERY           FAMILY HISTORY  Family History   Problem Relation Age of Onset    No Known Problems Mother     No Known Problems Father          SOCIAL HISTORY  Social History     Socioeconomic History    Marital status: Single   Tobacco Use    Smoking status: Never    Smokeless tobacco: Never   Vaping Use    Vaping Use: Never used   Substance and Sexual Activity    Alcohol use: No    Drug use: Never    Sexual  activity: Defer         ALLERGIES  Penicillins        REVIEW OF SYSTEMS  Review of Systems     All systems reviewed and negative except for those discussed in HPI.       PHYSICAL EXAM  ED Triage Vitals   Temp Heart Rate Resp BP SpO2   08/17/23 0803 08/17/23 0803 08/17/23 0803 08/17/23 0807 08/17/23 0803   97.8 øF (36.6 øC) 118 18 (!) 123/101 97 %      Temp src Heart Rate Source Patient Position BP Location FiO2 (%)   08/17/23 0803 -- -- -- --   Tympanic           Physical Exam      GENERAL: no acute distress  HENT: nares patent, oral pharyngeal erythema with small mount of exudate on the right tonsil  EYES: no scleral icterus  CV: regular rhythm, normal rate  RESPIRATORY: normal effort, clear to auscultation bilaterally  ABDOMEN: soft, nontender  MUSCULOSKELETAL: no deformity  NEURO: alert, moves all extremities, follows commands  PSYCH:  calm, cooperative  SKIN: warm, dry    Vital signs and nursing notes reviewed.          LAB RESULTS  Recent Results (from the past 24 hour(s))   Comprehensive Metabolic Panel    Collection Time: 08/16/23  2:52 PM    Specimen: Blood   Result Value Ref Range    Glucose 73 65 - 99 mg/dL    BUN 4 (L) 6 - 20 mg/dL    Creatinine 0.66 0.57 - 1.00 mg/dL    Sodium 138 136 - 145 mmol/L    Potassium 3.8 3.5 - 5.2 mmol/L    Chloride 99 98 - 107 mmol/L    CO2 23.8 22.0 - 29.0 mmol/L    Calcium 9.1 8.6 - 10.5 mg/dL    Total Protein 6.9 6.0 - 8.5 g/dL    Albumin 3.8 3.5 - 5.2 g/dL    ALT (SGPT) 638 (H) 1 - 33 U/L    AST (SGOT) 491 (H) 1 - 32 U/L    Alkaline Phosphatase 397 (H) 39 - 117 U/L    Total Bilirubin 2.2 (H) 0.0 - 1.2 mg/dL    Globulin 3.1 gm/dL    A/G Ratio 1.2 g/dL    BUN/Creatinine Ratio 6.1 (L) 7.0 - 25.0    Anion Gap 15.2 (H) 5.0 - 15.0 mmol/L    eGFR 122.7 >60.0 mL/min/1.73   CBC (No Diff)    Collection Time: 08/16/23  2:52 PM    Specimen: Blood   Result Value Ref Range    WBC 10.37 3.40 - 10.80 10*3/mm3    RBC 4.93 3.77 - 5.28 10*6/mm3    Hemoglobin 13.6 12.0 - 15.9 g/dL     Hematocrit 40.5 34.0 - 46.6 %    MCV 82.2 79.0 - 97.0 fL    MCH 27.6 26.6 - 33.0 pg    MCHC 33.6 31.5 - 35.7 g/dL    RDW 13.0 12.3 - 15.4 %    RDW-SD 38.4 37.0 - 54.0 fl    MPV 12.0 6.0 - 12.0 fL    Platelets 179 140 - 450 10*3/mm3   Comprehensive Metabolic Panel    Collection Time: 08/17/23  8:20 AM    Specimen: Blood   Result Value Ref Range    Glucose 93 65 - 99 mg/dL    BUN 4 (L) 6 - 20 mg/dL    Creatinine 0.65 0.57 - 1.00 mg/dL    Sodium 138 136 - 145 mmol/L    Potassium 3.7 3.5 - 5.2 mmol/L    Chloride 102 98 - 107 mmol/L    CO2 25.0 22.0 - 29.0 mmol/L    Calcium 8.8 8.6 - 10.5 mg/dL    Total Protein 6.3 6.0 - 8.5 g/dL    Albumin 3.9 3.5 - 5.2 g/dL    ALT (SGPT) 549 (H) 1 - 33 U/L    AST (SGOT) 339 (H) 1 - 32 U/L    Alkaline Phosphatase 406 (H) 39 - 117 U/L    Total Bilirubin 1.5 (H) 0.0 - 1.2 mg/dL    Globulin 2.4 gm/dL    A/G Ratio 1.6 g/dL    BUN/Creatinine Ratio 6.2 (L) 7.0 - 25.0    Anion Gap 11.0 5.0 - 15.0 mmol/L    eGFR 123.2 >60.0 mL/min/1.73   Lipase    Collection Time: 08/17/23  8:20 AM    Specimen: Blood   Result Value Ref Range    Lipase 18 13 - 60 U/L   Protime-INR    Collection Time: 08/17/23  8:20 AM    Specimen: Blood   Result Value Ref Range    Protime 13.1 11.7 - 14.2 Seconds    INR 0.98 0.90 - 1.10   hCG, Serum, Qualitative    Collection Time: 08/17/23  8:20 AM    Specimen: Blood   Result Value Ref Range    HCG Qualitative Negative Negative   Acetaminophen Level    Collection Time: 08/17/23  8:20 AM    Specimen: Blood   Result Value Ref Range    Acetaminophen <5.0 0.0 - 30.0 mcg/mL   Ethanol    Collection Time: 08/17/23  8:20 AM    Specimen: Blood   Result Value Ref Range    Ethanol <10 0 - 10 mg/dL    Ethanol % <0.010 %   Salicylate Level    Collection Time: 08/17/23  8:20 AM    Specimen: Blood   Result Value Ref Range    Salicylate <0.3 <=30.0 mg/dL   CBC Auto Differential    Collection Time: 08/17/23  8:20 AM    Specimen: Blood   Result Value Ref Range    WBC 9.81 3.40 - 10.80 10*3/mm3     RBC 4.63 3.77 - 5.28 10*6/mm3    Hemoglobin 13.0 12.0 - 15.9 g/dL    Hematocrit 38.2 34.0 - 46.6 %    MCV 82.5 79.0 - 97.0 fL    MCH 28.1 26.6 - 33.0 pg    MCHC 34.0 31.5 - 35.7 g/dL    RDW 13.1 12.3 - 15.4 %    RDW-SD 39.4 37.0 - 54.0 fl    MPV 11.2 6.0 - 12.0 fL    Platelets 174 140 - 450 10*3/mm3   Mononucleosis Screen    Collection Time: 08/17/23  8:20 AM    Specimen: Blood   Result Value Ref Range    Monospot Positive (A) Negative   Manual Differential    Collection Time: 08/17/23  8:20 AM    Specimen: Blood   Result Value Ref Range    Neutrophil % 32.0 (L) 42.7 - 76.0 %    Lymphocyte % 64.0 (H) 19.6 - 45.3 %    Monocyte % 4.0 (L) 5.0 - 12.0 %    Neutrophils Absolute 3.14 1.70 - 7.00 10*3/mm3    Lymphocytes Absolute 6.28 (H) 0.70 - 3.10 10*3/mm3    Monocytes Absolute 0.39 0.10 - 0.90 10*3/mm3    RBC Morphology Normal Normal    WBC Morphology Normal Normal    Platelet Morphology Normal Normal   Urinalysis With Microscopic If Indicated (No Culture) - Urine, Clean Catch    Collection Time: 08/17/23  8:34 AM    Specimen: Urine, Clean Catch   Result Value Ref Range    Color, UA Dark Yellow (A) Yellow, Straw    Appearance, UA Clear Clear    pH, UA 5.5 5.0 - 8.0    Specific Gravity, UA 1.020 1.005 - 1.030    Glucose, UA Negative Negative    Ketones, UA Trace (A) Negative    Bilirubin, UA Negative Negative    Blood, UA Negative Negative    Protein, UA Trace (A) Negative    Leuk Esterase, UA Negative Negative    Nitrite, UA Negative Negative    Urobilinogen, UA 2.0 E.U./dL (A) 0.2 - 1.0 E.U./dL       Ordered the above labs and reviewed the results.        RADIOLOGY  CT Abdomen Pelvis With Contrast    Result Date: 8/16/2023  CT ABDOMEN AND PELVIS WITH IV CONTRAST  HISTORY: Reported nausea, vomiting and abdominal pain.  TECHNIQUE: Radiation dose reduction techniques were utilized, including automated exposure control and exposure modulation based on body size. 3 mm images were obtained through the abdomen and pelvis  after the administration of IV contrast.  COMPARISON: None available  FINDINGS: Motion artifact limits evaluation. Within that limitation, tiny hypodense left hepatic lesion, likely benign cyst or hemangioma. Normal remaining solid organs and bowel. Right ovarian physiologic follicle. Small volume likely physiologic pelvic free fluid. Nonaneurysmal abdominal aorta. No abdominal pelvic lymphadenopathy. No focal osseous lesion.      Motion limited study. Within that limitation, no acute abdominopelvic abnormality.  This report was finalized on 8/16/2023 2:19 PM by Dr. Colton Hoang M.D.      US Gallbladder    Result Date: 8/17/2023  PROCEDURE: US GALLBLADDER-  HISTORY: Elevated LFTs.  TECHNIQUE: Grayscale and color Doppler ultrasound of the gallbladder was performed.  COMPARISON: CT abdomen and pelvis 8/16/2023  FINDINGS:  MEASUREMENTS: Liver: 14.8 cm Common bile duct diameter: 0.4 cm Right kidney: 11.5 cm  LIVER: The liver is normal in size and echogenicity. The echotexture is smooth. There is no intrahepatic mass or biliary ductal dilatation. The hypodense focus within the liver on recent CT is not clearly visualized on ultrasound. There is hepatopetal flow in the main portal vein.  GALLBLADDER: The gallbladder is present without cholelithiasis, wall thickening or pericholecystic fluid. No sonographic Parmar's sign was elicited. There is no extrahepatic biliary ductal dilatation.  PANCREAS: The visualized portions of the proximal pancreas are within normal limits.  RIGHT KIDNEY: There is no hydronephrosis. No shadowing renal stone is visualized.        No gallstones or biliary ductal dilatation.  This report was finalized on 8/17/2023 9:30 AM by Dr. Jessica Gregory M.D.       Ordered the above noted radiological studies. Reviewed by me in PACS.          PROCEDURES  Procedures          MEDICATIONS GIVEN IN ER  Medications   sodium chloride 0.9 % flush 10 mL (has no administration in time range)         MEDICAL  DECISION MAKING, PROGRESS, and CONSULTS    Discussion below represents my analysis of pertinent findings related to patient's condition, differential diagnosis, treatment plan and final disposition.      Orders placed during this visit:  Orders Placed This Encounter   Procedures    US Gallbladder    Comprehensive Metabolic Panel    Lipase    Protime-INR    Urinalysis With Microscopic If Indicated (No Culture) - Urine, Clean Catch    hCG, Serum, Qualitative    Acetaminophen Level    Ethanol    Salicylate Level    CBC Auto Differential    CMV IgG IgM    Mononucleosis Screen    Manual Differential    Anti-Smooth Muscle Antibody Titer    Mitochondrial Antibodies, M2    Alpha - 1 - Antitrypsin    Ceruloplasmin    Gamma GT    Iron Profile    ASHTYN Comprehensive Panel    Celiac Comprehensive Panel    Monitor Blood Pressure    Pulse Oximetry, Continuous    Gastroenterology (on-call MD unless specified)    Insert Peripheral IV    CBC & Differential         Additional sources:    - External (non-ED) record review: See summary in the ED course below              Differential diagnosis:    Transaminitis due to gallstones, toxins, intrinsic genetic disease, infection such as tickborne illness, CMV or EBV    After initial evaluation, I considered the need for admission given her rather significant LFT elevation from yesterday.  Her AST and ALT were over 500 and her bilirubin was 2.2.        Independent interpretation of labs, radiology studies, and discussions with consultants:  ED Course as of 08/17/23 0956   Thu Aug 17, 2023   0811 On medical chart review, I reviewed the CT pelvis was performed yesterday.  Is limited due to motion artifact.  Within that limitation, no acute findings were identified on CT.  I did review her labs from yesterday.  Her AST was 491 and ALT was 638.  Bilirubin 2.2. [TD]   0814 On medical chart review, reviewed progress note from Cristobal Pabon DO from 5/8/2023 who is her family medicine physician.   Patient was seen for anxiety.  She is prescribed Lexapro.  I then saw progress note by KIYA Nichols with family medicine on 8/11/2030.  Patient was diagnosed with pharyngitis and prescribed azithromycin.  Also noted to have lymphadenopathy and fatigue.  She had a CBC, CMP and Tanner-Barr virus collected today to rule out mononucleosis.  Z-Earl was prescribed. [TD]   0929 I discussed ultrasound results with Kira in ultrasound.  US is negative. [TD]   0929 Monospot(!): Positive [TD]   0930 ALT (SGPT)(!): 549 [TD]   0930 AST (SGOT)(!): 339 [TD]   0930 Alkaline Phosphatase(!): 406 [TD]   0930 Total Bilirubin(!): 1.5 [TD]   0930 Patient's LFTs are overall improving compared to 18 hours ago.  Total bilirubin is down from 2.2 to 1.5. [TD]   0954 I discussed the case with nurse mikhail Miranda.  She evaluated the patient answer questions fully.  Okay to discharge home with PCP follow-up in 1 week.  We both agree this is almost certainly due to the patient's positive monotest.  In fact, her LFTs are improved compared to yesterday.  Patient is comfortable with this plan. [TD]      ED Course User Index  [TD] Domingo Renteria II, MD             DIAGNOSIS  Final diagnoses:   EBV positive mononucleosis syndrome   Transaminitis         DISPOSITION  DISCHARGE    FOLLOW-UP  Cristobal Baez DO  [address] [facility] [id number] [phone]    Schedule an appointment as soon as possible for a visit in 1 week           Medication List      No changes were made to your prescriptions during this visit.             Latest Documented Vital Signs:  As of 09:56 EDT  BP- (!) 123/101 HR- 96 Temp- 97.8 øF (36.6 øC) (Tympanic) O2 sat- 96%      --    Please note that portions of this were completed with a voice recognition program.       Note Disclaimer: At [facility], we believe that sharing information builds trust and better relationships. You are receiving this note because you are receiving care at [facility] or recently visited. It is possible you will see health information before a provider has talked with you about it. This kind of information can be easy to misunderstand. To help you fully understand what it means for your health, we urge you to discuss this note with your provider.         Domingo Renteria II, MD  08/17/23 0951

## 2023-08-17 NOTE — CONSULTS
Gastroenterology   Initial Inpatient Consult Note    Referring Provider: Dr. Domingo Renteria    Reason for Consultation: Elevated liver enzymes    Subjective     History of present illness:    27 y.o. female presented to the ED with elevated LFTs.  She was seen and evaluated by her ECP approximately 6 days ago for sore throat and fever.  She underwent CT of the abdomen and pelvis yesterday which did not reveal any abnormal findings aside from a tiny hypodense left hepatic lesion that is likely benign cyst or hemangioma.  Lab work today revealed an alkaline phosphatase of 406, AST of 339, ALT of 549 and total bili of 1.5.  This is improved over values on 8/16 which revealed an AST of 491, ALT of 632 and total bilirubin of 2.2.  The patient was reporting some mild left upper quadrant abdominal pain prior to her ER visit today but is no longer experiencing any discomfort.  She does report fatigue.  She states that her original monotest was negative.  However, Monospot today was positive.  She did experience some emesis on Sunday and Monday but is not currently having this symptom.  She denies any illicit drug use, recent tattoos, or travel.  She denies weight loss.    Past Medical History:  Past Medical History:   Diagnosis Date    Allergic     Anxiety     Depression      Past Surgical History:  Past Surgical History:   Procedure Laterality Date    FINGER FRACTURE SURGERY        Social History:   Social History     Tobacco Use    Smoking status: Never    Smokeless tobacco: Never   Substance Use Topics    Alcohol use: No      Family History:  Family History   Problem Relation Age of Onset    No Known Problems Mother     No Known Problems Father        Home Meds:  (Not in a hospital admission)    Current Meds:      Allergies:  Allergies   Allergen Reactions    Penicillins Rash     Review of Systems  Pertinent items are noted in HPI, all other systems reviewed and negative    Objective     Vital Signs  Temp:  [97.8 øF (36.6  øC)] 97.8 øF (36.6 øC)  Heart Rate:  [] 96  Resp:  [18] 18  BP: (123)/(101) 123/101    Physical Exam:  CONSTITUTIONAL:  today's vital signs reviewed  EARS NOSE THROAT: trachea midline and no deformity of the nares  EYES: no scleral icterus  GASTROINTESTINAL: abdomen is soft, nontender, nondistended with normal active bowel sounds, no masses are appreciated  PSYCHIATRIC: appropriate mood and affect  RESPIRATORY: normal inspiratory effort with no increased work of breathing  NEUROLOGIC: patient is awake and alert  DERMATOLOGIC: skin is warm with no cyanosis  LYMPHATIC: no periumbilical lymphadenopathy     Results Review:   I reviewed the patient's new clinical results.  I reviewed the patient's new imaging results and agree with the interpretation.  I reviewed the patient's other test results and agree with the interpretation    Results from last 7 days   Lab Units 08/17/23  0820 08/16/23  1452 08/11/23  0941   WBC 10*3/mm3 9.81 10.37 4.71   HEMOGLOBIN g/dL 13.0 13.6 13.6   HEMATOCRIT % 38.2 40.5 40.0   PLATELETS 10*3/mm3 174 179 165     Results from last 7 days   Lab Units 08/17/23  0820 08/16/23  1452 08/11/23  0941   SODIUM mmol/L 138 138 140   POTASSIUM mmol/L 3.7 3.8 4.3   CHLORIDE mmol/L 102 99 104   CO2 mmol/L 25.0 23.8 23.5   BUN mg/dL 4* 4* 8   CREATININE mg/dL 0.65 0.66 0.72   CALCIUM mg/dL 8.8 9.1 9.0   BILIRUBIN mg/dL 1.5* 2.2* 0.7   ALK PHOS U/L 406* 397* 157*   ALT (SGPT) U/L 549* 638* 148*   AST (SGOT) U/L 339* 491* 139*   GLUCOSE mg/dL 93 73 70     Results from last 7 days   Lab Units 08/17/23  0820   INR  0.98     Lab Results   Lab Value Date/Time    LIPASE 18 08/17/2023 0820       Radiology:  US Gallbladder   Final Result       No gallstones or biliary ductal dilatation.       This report was finalized on 8/17/2023 9:30 AM by Dr. Jessica Ghobadi M.D.              Assessment & Plan   There are no active hospital problems to display for this patient.      Assessment:  Elevated liver  enzymes  Fatigue  Mononucleosis    Plan:  Patient's LFTs are likely elevated secondary to mononucleosis as confirmed by Monospot testing today which was positive.  Additional lab work was ordered to complete full liver lab work-up to ensure that there are no other etiologies responsible for the elevation in her LFTs.  RICHIE spoke with the ED physician and recommended patient follow-up with her PCP on Monday and have a hepatic function panel performed to trend LFTs.  We will contact the patient for follow-up regarding the remainder of her liver lab work-up that was ordered here at the hospital.  Okay for discharge from GI standpoint.  As always, thank you for allowing us to participate in the care of your patient.  If we can be of further assistance please not hesitate to reach out to us.    I discussed e patients findings and my recommendations with patient, primary care team, and consulting provider.    RICHIE Martínez M.D.  [facility] [place] [address]  Office: [phone]

## 2023-08-18 LAB
CENTROMERE B AB SER-ACNC: <0.2 AI (ref 0–0.9)
CHROMATIN AB SERPL-ACNC: <0.2 AI (ref 0–0.9)
CMV IGG SERPL IA-ACNC: <0.6 U/ML (ref 0–0.59)
CMV IGM SERPL IA-ACNC: <30 AU/ML (ref 0–29.9)
DSDNA AB SER-ACNC: 2 IU/ML (ref 0–9)
ENA JO1 AB SER-ACNC: <0.2 AI (ref 0–0.9)
ENA RNP AB SER-ACNC: 0.4 AI (ref 0–0.9)
ENA SCL70 AB SER-ACNC: <0.2 AI (ref 0–0.9)
ENA SM AB SER-ACNC: <0.2 AI (ref 0–0.9)
ENA SS-A AB SER-ACNC: <0.2 AI (ref 0–0.9)
ENA SS-B AB SER-ACNC: <0.2 AI (ref 0–0.9)
ENDOMYSIUM IGA SER QL: NEGATIVE
GLIADIN PEPTIDE IGA SER-ACNC: 7 UNITS (ref 0–19)
GLIADIN PEPTIDE IGG SER-ACNC: 3 UNITS (ref 0–19)
IGA SERPL-MCNC: 173 MG/DL (ref 87–352)
Lab: NORMAL
MITOCHONDRIA M2 IGG SER-ACNC: <20 UNITS (ref 0–20)
SMA IGG SER-ACNC: 8 UNITS (ref 0–19)
TTG IGA SER-ACNC: <2 U/ML (ref 0–3)
TTG IGG SER-ACNC: 2 U/ML (ref 0–5)

## 2023-08-30 ENCOUNTER — OFFICE VISIT (OUTPATIENT)
Dept: FAMILY MEDICINE CLINIC | Facility: CLINIC | Age: 28
End: 2023-08-30
Payer: MEDICAID

## 2023-08-30 VITALS
OXYGEN SATURATION: 97 % | BODY MASS INDEX: 29.99 KG/M2 | TEMPERATURE: 97.6 F | SYSTOLIC BLOOD PRESSURE: 120 MMHG | WEIGHT: 180 LBS | DIASTOLIC BLOOD PRESSURE: 82 MMHG | HEART RATE: 88 BPM | HEIGHT: 65 IN

## 2023-08-30 DIAGNOSIS — B17.8 INFECTIOUS MONONUCLEOSIS HEPATITIS: Primary | ICD-10-CM

## 2023-08-30 DIAGNOSIS — B27.99 INFECTIOUS MONONUCLEOSIS HEPATITIS: Primary | ICD-10-CM

## 2023-08-30 PROCEDURE — 1159F MED LIST DOCD IN RCRD: CPT | Performed by: FAMILY MEDICINE

## 2023-08-30 PROCEDURE — 99213 OFFICE O/P EST LOW 20 MIN: CPT | Performed by: FAMILY MEDICINE

## 2023-08-30 PROCEDURE — 1160F RVW MEDS BY RX/DR IN RCRD: CPT | Performed by: FAMILY MEDICINE

## 2023-08-30 NOTE — PROGRESS NOTES
Subjective   Brielle Alcocer is a 27 y.o. female with   Chief Complaint   Patient presents with    ER Follow up     Was sent to ER by Zahida due to liver enzymes being elevated   .    History of Present Illness   27-year-old white female here in follow-up from recent ER visit.  Apparently on 11 August patient was seen in this office by another provider with fever, sore throat and abdominal pain.  Evaluation ensued with negative strep test and lab work was performed.  Of note was negative Tanner-Frey titers at that time as well as elevated liver enzymes.  On 16 August a repeat of labs were performed with liver enzymes markedly elevated.  Patient was sent to the emergency room for their evaluation.  She went the day following and again laboratory studies were drawn with elevated LFTs although had already shown a reduction from the day before.  Monospot was positive-patient has no previous history of Tanner-Barr infection.  She never became jaundiced and her urine never changed.  She was markedly fatigued and is much better at this point.  She missed a marked amount of work at that time but is back working again.  She has had no abdominal pain, appetite is normal and patient denies nausea/vomiting.  Her boyfriend to date has not had this issue and she is unsure as to where she might have acquired this.  The following portions of the patient's history were reviewed and updated as appropriate: allergies, current medications, past family history, past medical history, past social history, past surgical history and problem list.    Review of Systems   HENT:          Infectious mononucleosis hepatitis   Gastrointestinal:  Positive for abdominal pain.        Elevated LFTs     Objective     Vitals:    08/30/23 0859   BP: 120/82   Pulse: 88   Temp: 97.6 øF (36.4 øC)   SpO2: 97%       Recent Results (from the past 672 hour(s))   CBC & Differential    Collection Time: 08/11/23  9:41 AM    Specimen: Blood   Result Value Ref  Range    WBC 4.71 3.40 - 10.80 10*3/mm3    RBC 4.80 3.77 - 5.28 10*6/mm3    Hemoglobin 13.6 12.0 - 15.9 g/dL    Hematocrit 40.0 34.0 - 46.6 %    MCV 83.3 79.0 - 97.0 fL    MCH 28.3 26.6 - 33.0 pg    MCHC 34.0 31.5 - 35.7 g/dL    RDW 12.9 12.3 - 15.4 %    Platelets 165 140 - 450 10*3/mm3    Neutrophil Rel % CANCELED     Lymphocyte Rel % CANCELED     Monocyte Rel % CANCELED     Eosinophil Rel % CANCELED     Lymphocytes Absolute CANCELED     Eosinophils Absolute CANCELED     Basophils Absolute CANCELED    Comprehensive Metabolic Panel    Collection Time: 08/11/23  9:41 AM    Specimen: Blood   Result Value Ref Range    Glucose 70 65 - 99 mg/dL    BUN 8 6 - 20 mg/dL    Creatinine 0.72 0.57 - 1.00 mg/dL    EGFR Result 117.0 >60.0 mL/min/1.73    BUN/Creatinine Ratio 11.1 7.0 - 25.0    Sodium 140 136 - 145 mmol/L    Potassium 4.3 3.5 - 5.2 mmol/L    Chloride 104 98 - 107 mmol/L    Total CO2 23.5 22.0 - 29.0 mmol/L    Calcium 9.0 8.6 - 10.5 mg/dL    Total Protein 5.8 (L) 6.0 - 8.5 g/dL    Albumin 3.9 3.5 - 5.2 g/dL    Globulin 1.9 gm/dL    A/G Ratio 2.1 g/dL    Total Bilirubin 0.7 0.0 - 1.2 mg/dL    Alkaline Phosphatase 157 (H) 39 - 117 U/L    AST (SGOT) 139 (H) 1 - 32 U/L    ALT (SGPT) 148 (H) 1 - 33 U/L   EBV Antibody Profile    Collection Time: 08/11/23  9:41 AM    Specimen: Blood   Result Value Ref Range    EBV VCA IgM <36.0 0.0 - 35.9 U/mL    EBV VCA IgG <18.0 0.0 - 17.9 U/mL    EBV Nuclear Antigen Ab, IgG <18.0 0.0 - 17.9 U/mL    Interpretation Comment    Manual Differential    Collection Time: 08/11/23  9:41 AM   Result Value Ref Range    Neutrophil Rel % 50.0 42.7 - 76.0 %    Lymphocyte Rel % 42.0 19.6 - 45.3 %    Monocyte Rel % 8.0 5.0 - 12.0 %    Eosinophil Rel % 0.0 (L) 0.3 - 6.2 %    Basophil Rel % 0.0 0.0 - 1.5 %    Neutrophils Absolute 2.36 1.70 - 7.00 10*3/mm3    Lymphocytes Absolute 1.98 0.70 - 3.10 10*3/mm3    Monocytes Absolute 0.38 0.10 - 0.90 10*3/mm3    Eosinophil Abs 0.00 0.00 - 0.40 10*3/mm3     Basophils Absolute 0.00 0.00 - 0.20 10*3/mm3    Differential Comment Comment     Comment Comment     Plt Comment Comment    Hepatitis Panel, Acute    Collection Time: 08/11/23  9:41 AM   Result Value Ref Range    Hep A IgM Negative Negative    Hepatitis B Surface Ag Negative Negative    Hep B Core IgM Negative Negative    Hepatitis C Ab Non Reactive Non Reactive   Interpretation:    Collection Time: 08/11/23  9:41 AM   Result Value Ref Range    Interpretation Comment    Written Authorization    Collection Time: 08/11/23  9:41 AM   Result Value Ref Range    Written Authorization Comment    POC Rapid Strep A    Collection Time: 08/11/23  9:45 AM    Specimen: Swab   Result Value Ref Range    Rapid Strep A Screen Negative Negative, VALID, INVALID, Not Performed    Internal Control Passed Passed    Lot Number [id number]     Expiration Date 10-29-24    Comprehensive Metabolic Panel    Collection Time: 08/16/23  2:52 PM    Specimen: Blood   Result Value Ref Range    Glucose 73 65 - 99 mg/dL    BUN 4 (L) 6 - 20 mg/dL    Creatinine 0.66 0.57 - 1.00 mg/dL    Sodium 138 136 - 145 mmol/L    Potassium 3.8 3.5 - 5.2 mmol/L    Chloride 99 98 - 107 mmol/L    CO2 23.8 22.0 - 29.0 mmol/L    Calcium 9.1 8.6 - 10.5 mg/dL    Total Protein 6.9 6.0 - 8.5 g/dL    Albumin 3.8 3.5 - 5.2 g/dL    ALT (SGPT) 638 (H) 1 - 33 U/L    AST (SGOT) 491 (H) 1 - 32 U/L    Alkaline Phosphatase 397 (H) 39 - 117 U/L    Total Bilirubin 2.2 (H) 0.0 - 1.2 mg/dL    Globulin 3.1 gm/dL    A/G Ratio 1.2 g/dL    BUN/Creatinine Ratio 6.1 (L) 7.0 - 25.0    Anion Gap 15.2 (H) 5.0 - 15.0 mmol/L    eGFR 122.7 >60.0 mL/min/1.73   CBC (No Diff)    Collection Time: 08/16/23  2:52 PM    Specimen: Blood   Result Value Ref Range    WBC 10.37 3.40 - 10.80 10*3/mm3    RBC 4.93 3.77 - 5.28 10*6/mm3    Hemoglobin 13.6 12.0 - 15.9 g/dL    Hematocrit 40.5 34.0 - 46.6 %    MCV 82.2 79.0 - 97.0 fL    MCH 27.6 26.6 - 33.0 pg    MCHC 33.6 31.5 - 35.7 g/dL    RDW 13.0 12.3 - 15.4 %     RDW-SD 38.4 37.0 - 54.0 fl    MPV 12.0 6.0 - 12.0 fL    Platelets 179 140 - 450 10*3/mm3   Comprehensive Metabolic Panel    Collection Time: 08/17/23  8:20 AM    Specimen: Blood   Result Value Ref Range    Glucose 93 65 - 99 mg/dL    BUN 4 (L) 6 - 20 mg/dL    Creatinine 0.65 0.57 - 1.00 mg/dL    Sodium 138 136 - 145 mmol/L    Potassium 3.7 3.5 - 5.2 mmol/L    Chloride 102 98 - 107 mmol/L    CO2 25.0 22.0 - 29.0 mmol/L    Calcium 8.8 8.6 - 10.5 mg/dL    Total Protein 6.3 6.0 - 8.5 g/dL    Albumin 3.9 3.5 - 5.2 g/dL    ALT (SGPT) 549 (H) 1 - 33 U/L    AST (SGOT) 339 (H) 1 - 32 U/L    Alkaline Phosphatase 406 (H) 39 - 117 U/L    Total Bilirubin 1.5 (H) 0.0 - 1.2 mg/dL    Globulin 2.4 gm/dL    A/G Ratio 1.6 g/dL    BUN/Creatinine Ratio 6.2 (L) 7.0 - 25.0    Anion Gap 11.0 5.0 - 15.0 mmol/L    eGFR 123.2 >60.0 mL/min/1.73   Lipase    Collection Time: 08/17/23  8:20 AM    Specimen: Blood   Result Value Ref Range    Lipase 18 13 - 60 U/L   Protime-INR    Collection Time: 08/17/23  8:20 AM    Specimen: Blood   Result Value Ref Range    Protime 13.1 11.7 - 14.2 Seconds    INR 0.98 0.90 - 1.10   hCG, Serum, Qualitative    Collection Time: 08/17/23  8:20 AM    Specimen: Blood   Result Value Ref Range    HCG Qualitative Negative Negative   Acetaminophen Level    Collection Time: 08/17/23  8:20 AM    Specimen: Blood   Result Value Ref Range    Acetaminophen <5.0 0.0 - 30.0 mcg/mL   Ethanol    Collection Time: 08/17/23  8:20 AM    Specimen: Blood   Result Value Ref Range    Ethanol <10 0 - 10 mg/dL    Ethanol % <0.010 %   Salicylate Level    Collection Time: 08/17/23  8:20 AM    Specimen: Blood   Result Value Ref Range    Salicylate <0.3 <=30.0 mg/dL   CBC Auto Differential    Collection Time: 08/17/23  8:20 AM    Specimen: Blood   Result Value Ref Range    WBC 9.81 3.40 - 10.80 10*3/mm3    RBC 4.63 3.77 - 5.28 10*6/mm3    Hemoglobin 13.0 12.0 - 15.9 g/dL    Hematocrit 38.2 34.0 - 46.6 %    MCV 82.5 79.0 - 97.0 fL    MCH 28.1  26.6 - 33.0 pg    MCHC 34.0 31.5 - 35.7 g/dL    RDW 13.1 12.3 - 15.4 %    RDW-SD 39.4 37.0 - 54.0 fl    MPV 11.2 6.0 - 12.0 fL    Platelets 174 140 - 450 10*3/mm3   CMV IgG IgM    Collection Time: 08/17/23  8:20 AM    Specimen: Blood   Result Value Ref Range    CMV IgG <0.60 0.00 - 0.59 U/mL    CMV IgM <30.0 0.0 - 29.9 AU/mL   Mononucleosis Screen    Collection Time: 08/17/23  8:20 AM    Specimen: Blood   Result Value Ref Range    Monospot Positive (A) Negative   Manual Differential    Collection Time: 08/17/23  8:20 AM    Specimen: Blood   Result Value Ref Range    Neutrophil % 32.0 (L) 42.7 - 76.0 %    Lymphocyte % 64.0 (H) 19.6 - 45.3 %    Monocyte % 4.0 (L) 5.0 - 12.0 %    Neutrophils Absolute 3.14 1.70 - 7.00 10*3/mm3    Lymphocytes Absolute 6.28 (H) 0.70 - 3.10 10*3/mm3    Monocytes Absolute 0.39 0.10 - 0.90 10*3/mm3    RBC Morphology Normal Normal    WBC Morphology Normal Normal    Platelet Morphology Normal Normal   Alpha - 1 - Antitrypsin    Collection Time: 08/17/23  8:20 AM    Specimen: Blood   Result Value Ref Range    ALPHA -1 ANTITRYPSIN 199 90 - 200 mg/dL   Ceruloplasmin    Collection Time: 08/17/23  8:20 AM    Specimen: Blood   Result Value Ref Range    Ceruloplasmin 47 (H) 19 - 39 mg/dL   Gamma GT    Collection Time: 08/17/23  8:20 AM    Specimen: Blood   Result Value Ref Range     (H) 5 - 36 U/L   Iron Profile    Collection Time: 08/17/23  8:20 AM    Specimen: Blood   Result Value Ref Range    Iron 57 37 - 145 mcg/dL    Iron Saturation (TSAT) 13 (L) 20 - 50 %    Transferrin 285 200 - 360 mg/dL    TIBC 425 298 - 536 mcg/dL   Urinalysis With Microscopic If Indicated (No Culture) - Urine, Clean Catch    Collection Time: 08/17/23  8:34 AM    Specimen: Urine, Clean Catch   Result Value Ref Range    Color, UA Dark Yellow (A) Yellow, Straw    Appearance, UA Clear Clear    pH, UA 5.5 5.0 - 8.0    Specific Gravity, UA 1.020 1.005 - 1.030    Glucose, UA Negative Negative    Ketones, UA Trace (A)  Negative    Bilirubin, UA Negative Negative    Blood, UA Negative Negative    Protein, UA Trace (A) Negative    Leuk Esterase, UA Negative Negative    Nitrite, UA Negative Negative    Urobilinogen, UA 2.0 E.U./dL (A) 0.2 - 1.0 E.U./dL   Anti-Smooth Muscle Antibody Titer    Collection Time: 08/17/23  9:58 AM    Specimen: Blood   Result Value Ref Range    Smooth Muscle Ab 8 0 - 19 Units   Mitochondrial Antibodies, M2    Collection Time: 08/17/23  9:58 AM    Specimen: Blood   Result Value Ref Range    Mitochondrial Ab <20.0 0.0 - 20.0 Units   [facility] Comprehensive Panel    Collection Time: 08/17/23  9:58 AM    Specimen: Blood   Result Value Ref Range    Anti-DNA (DS) Ab Qn 2 0 - 9 IU/mL    RNP Antibodies 0.4 0.0 - 0.9 AI    Martinez Antibodies <0.2 0.0 - 0.9 AI    Antiscleroderma-70 Antibodies <0.2 0.0 - 0.9 AI    Sjogren's Anti-SS-A <0.2 0.0 - 0.9 AI    Sjogren's Anti-SS-B <0.2 0.0 - 0.9 AI    Antichromatin Antibodies <0.2 0.0 - 0.9 AI    QUINCY-1 IgG <0.2 0.0 - 0.9 AI    Anti-Centromere B Antibodies <0.2 0.0 - 0.9 AI    See below: Comment    Celiac Comprehensive Panel    Collection Time: 08/17/23  9:58 AM    Specimen: Blood   Result Value Ref Range    Gliadin Deamidated Peptide Ab, IgA 7 0 - 19 units    Deaminated Gliadin Ab IgG 3 0 - 19 units    Tissue Transglutaminase IgA <2 0 - 3 U/mL    Tissue Transglutaminase IgG 2 0 - 5 U/mL    Endomysial IgA Negative Negative    IgA 173 87 - 352 mg/dL       Physical Exam  Vitals and nursing note reviewed.   Constitutional:       Appearance: Normal appearance. She is well-developed and well-groomed.   HENT:      Head: Normocephalic and atraumatic.   Neck:      Thyroid: No thyroid mass or thyromegaly.      Vascular: Normal carotid pulses. No carotid bruit.      Trachea: Trachea and phonation normal.   Cardiovascular:      Rate and Rhythm: Normal rate and regular rhythm.      Heart sounds: Normal heart sounds. No murmur heard.    No friction rub. No gallop.   Pulmonary:      Effort:  Pulmonary effort is normal. No respiratory distress.      Breath sounds: Normal breath sounds. No decreased breath sounds, wheezing, rhonchi or rales.   Musculoskeletal:      Cervical back: Neck supple.   Lymphadenopathy:      Cervical: No cervical adenopathy.   Skin:     General: Skin is warm and dry.      Findings: No rash.   Neurological:      Mental Status: She is alert and oriented to person, place, and time.   Psychiatric:         Attention and Perception: Attention and perception normal.         Mood and Affect: Mood and affect normal.         Speech: Speech normal.         Behavior: Behavior normal. Behavior is cooperative.         Thought Content: Thought content normal.         Cognition and Memory: Cognition and memory normal.         Judgment: Judgment normal.       Assessment & Plan   Diagnoses and all orders for this visit:    1. Infectious mononucleosis hepatitis (Primary)  -     Comprehensive metabolic panel  -     CBC w AUTO Differential  -     Gamma GT        Return if symptoms worsen or fail to improve, for Next scheduled follow up.

## 2023-08-31 LAB
ALBUMIN SERPL-MCNC: 4.3 G/DL (ref 3.5–5.2)
ALBUMIN/GLOB SERPL: 1.7 G/DL
ALP SERPL-CCNC: 144 U/L (ref 39–117)
ALT SERPL-CCNC: 127 U/L (ref 1–33)
AST SERPL-CCNC: 76 U/L (ref 1–32)
BASOPHILS # BLD MANUAL: 0.06 10*3/MM3 (ref 0–0.2)
BASOPHILS NFR BLD MANUAL: 1 % (ref 0–1.5)
BILIRUB SERPL-MCNC: 0.7 MG/DL (ref 0–1.2)
BUN SERPL-MCNC: 9 MG/DL (ref 6–20)
BUN/CREAT SERPL: 14.5 (ref 7–25)
CALCIUM SERPL-MCNC: 9.1 MG/DL (ref 8.6–10.5)
CHLORIDE SERPL-SCNC: 105 MMOL/L (ref 98–107)
CO2 SERPL-SCNC: 25.6 MMOL/L (ref 22–29)
CREAT SERPL-MCNC: 0.62 MG/DL (ref 0.57–1)
DIFFERENTIAL COMMENT: ABNORMAL
EGFRCR SERPLBLD CKD-EPI 2021: 124.6 ML/MIN/1.73
EOSINOPHIL # BLD MANUAL: 0 10*3/MM3 (ref 0–0.4)
EOSINOPHIL NFR BLD MANUAL: 0 % (ref 0.3–6.2)
ERYTHROCYTE [DISTWIDTH] IN BLOOD BY AUTOMATED COUNT: 13.3 % (ref 12.3–15.4)
GGT SERPL-CCNC: 66 U/L (ref 5–36)
GLOBULIN SER CALC-MCNC: 2.5 GM/DL
GLUCOSE SERPL-MCNC: 78 MG/DL (ref 65–99)
HCT VFR BLD AUTO: 39.2 % (ref 34–46.6)
HGB BLD-MCNC: 13.4 G/DL (ref 12–15.9)
LYMPHOCYTES # BLD MANUAL: 3.73 10*3/MM3 (ref 0.7–3.1)
LYMPHOCYTES NFR BLD MANUAL: 58 % (ref 19.6–45.3)
MCH RBC QN AUTO: 28.6 PG (ref 26.6–33)
MCHC RBC AUTO-ENTMCNC: 34.2 G/DL (ref 31.5–35.7)
MCV RBC AUTO: 83.6 FL (ref 79–97)
MONOCYTES # BLD MANUAL: 0.78 10*3/MM3 (ref 0.1–0.9)
MONOCYTES NFR BLD MANUAL: 13 % (ref 5–12)
NEUTROPHILS # BLD MANUAL: 1.44 10*3/MM3 (ref 1.7–7)
NEUTROPHILS NFR BLD MANUAL: 24 % (ref 42.7–76)
NRBC BLD AUTO-RTO: 0 /100 WBC (ref 0–0.2)
PLATELET # BLD AUTO: 279 10*3/MM3 (ref 140–450)
PLATELET BLD QL SMEAR: ABNORMAL
POTASSIUM SERPL-SCNC: 4.7 MMOL/L (ref 3.5–5.2)
PROT SERPL-MCNC: 6.8 G/DL (ref 6–8.5)
RBC # BLD AUTO: 4.69 10*6/MM3 (ref 3.77–5.28)
RBC MORPH BLD: ABNORMAL
SODIUM SERPL-SCNC: 139 MMOL/L (ref 136–145)
WBC # BLD AUTO: 6.02 10*3/MM3 (ref 3.4–10.8)

## 2024-10-07 ENCOUNTER — TELEMEDICINE (OUTPATIENT)
Dept: FAMILY MEDICINE CLINIC | Facility: CLINIC | Age: 29
End: 2024-10-07
Payer: MEDICAID

## 2024-10-07 DIAGNOSIS — R53.83 OTHER FATIGUE: Primary | ICD-10-CM

## 2024-10-07 DIAGNOSIS — F51.04 PSYCHOPHYSIOLOGICAL INSOMNIA: ICD-10-CM

## 2024-10-07 DIAGNOSIS — E66.09 EXOGENOUS OBESITY: ICD-10-CM

## 2024-10-07 DIAGNOSIS — E55.9 VITAMIN D DEFICIENCY: ICD-10-CM

## 2024-10-07 PROCEDURE — 1160F RVW MEDS BY RX/DR IN RCRD: CPT | Performed by: FAMILY MEDICINE

## 2024-10-07 PROCEDURE — 99213 OFFICE O/P EST LOW 20 MIN: CPT | Performed by: FAMILY MEDICINE

## 2024-10-07 PROCEDURE — 1159F MED LIST DOCD IN RCRD: CPT | Performed by: FAMILY MEDICINE

## 2024-10-07 NOTE — PROGRESS NOTES
Subjective   Brielle Alcocer is a 29 y.o. female with No chief complaint on file.  .    History of Present Illness   29-year-old white female with consented video visit with patient at her home and myself in the office.  Patient complains that she wakes up every morning and does not feel rested.  She also has difficulty sleeping.  She has self discontinued the Lexapro that she once was on stating that it was difficult for her to remember to take a pill every day.  In general she still feels that she is doing well with no real issues with moods or anxiety.  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  She has a long history of obesity and is unsure as to whether she snores.  She does not wake up with her covers in a ball.  She does wonder if her thyroid or other lab parameters may be contributing to this issue.  The following portions of the patient's history were reviewed and updated as appropriate: allergies, current medications, past family history, past medical history, past social history, past surgical history and problem list.    Review of Systems   Constitutional:  Positive for fatigue.   Endocrine:        Exogenous obesity   Psychiatric/Behavioral:  Positive for sleep disturbance.        Objective     There were no vitals filed for this visit.    No results found for this or any previous visit (from the past 672 hour(s)).    Physical Exam  Vitals and nursing note reviewed.   Constitutional:       Appearance: Normal appearance. She is well-developed and well-groomed.   HENT:      Head: Normocephalic and atraumatic.   Musculoskeletal:      Cervical back: Neck supple.   Skin:     General: Skin is warm and dry.      Findings: No rash.   Neurological:      Mental Status: She is alert and oriented to person, place, and time.   Psychiatric:         Attention and Perception: Attention and perception normal.         Mood and Affect: Mood and affect normal.         Speech: Speech normal.         Behavior:  Behavior normal. Behavior is cooperative.         Thought Content: Thought content normal.         Cognition and Memory: Cognition and memory normal.         Judgment: Judgment normal.         Assessment & Plan   Diagnoses and all orders for this visit:    1. Other fatigue (Primary)  -     Comprehensive metabolic panel; Future  -     Vitamin D 25 hydroxy; Future  -     TSH; Future  -     Lipid panel; Future  -     CBC w AUTO Differential; Future    2. Psychophysiological insomnia  -     Comprehensive metabolic panel; Future  -     Vitamin D 25 hydroxy; Future  -     TSH; Future  -     Lipid panel; Future  -     CBC w AUTO Differential; Future    3. Vitamin D deficiency  -     Comprehensive metabolic panel; Future  -     Vitamin D 25 hydroxy; Future  -     TSH; Future  -     Lipid panel; Future  -     CBC w AUTO Differential; Future    4. Exogenous obesity  -     Comprehensive metabolic panel; Future  -     Vitamin D 25 hydroxy; Future  -     TSH; Future  -     Lipid panel; Future  -     CBC w AUTO Differential; Future    Laboratory studies are pending and upon completion if underlying etiology is not observed and causing her complaints we will consider sleep evaluation.  May also need to rediscuss depression with anxiety features.  Follow-up based on the above.  Consented video visit took 15 minutes for completion.    Return if symptoms worsen or fail to improve.  BMI is >= 25 and <30. (Overweight) The following options were offered after discussion;: exercise counseling/recommendations and nutrition counseling/recommendations

## 2025-01-28 ENCOUNTER — OFFICE VISIT (OUTPATIENT)
Dept: FAMILY MEDICINE CLINIC | Facility: CLINIC | Age: 30
End: 2025-01-28
Payer: MEDICAID

## 2025-01-28 VITALS
HEIGHT: 65 IN | TEMPERATURE: 96.9 F | HEART RATE: 96 BPM | BODY MASS INDEX: 29.64 KG/M2 | WEIGHT: 177.9 LBS | DIASTOLIC BLOOD PRESSURE: 76 MMHG | SYSTOLIC BLOOD PRESSURE: 110 MMHG | OXYGEN SATURATION: 96 %

## 2025-01-28 DIAGNOSIS — R09.81 NASAL CONGESTION: ICD-10-CM

## 2025-01-28 DIAGNOSIS — J06.9 URI, ACUTE: Primary | ICD-10-CM

## 2025-01-28 DIAGNOSIS — R05.9 COUGH, UNSPECIFIED TYPE: ICD-10-CM

## 2025-01-28 LAB
EXPIRATION DATE: NORMAL
FLUAV AG UPPER RESP QL IA.RAPID: NOT DETECTED
FLUBV AG UPPER RESP QL IA.RAPID: NOT DETECTED
INTERNAL CONTROL: NORMAL
Lab: NORMAL
SARS-COV-2 AG UPPER RESP QL IA.RAPID: NOT DETECTED

## 2025-01-28 PROCEDURE — 99213 OFFICE O/P EST LOW 20 MIN: CPT | Performed by: FAMILY MEDICINE

## 2025-01-28 PROCEDURE — 1160F RVW MEDS BY RX/DR IN RCRD: CPT | Performed by: FAMILY MEDICINE

## 2025-01-28 PROCEDURE — 87428 SARSCOV & INF VIR A&B AG IA: CPT | Performed by: FAMILY MEDICINE

## 2025-01-28 PROCEDURE — 1159F MED LIST DOCD IN RCRD: CPT | Performed by: FAMILY MEDICINE

## 2025-01-28 RX ORDER — AZITHROMYCIN 250 MG/1
TABLET, FILM COATED ORAL
Qty: 6 TABLET | Refills: 0 | Status: SHIPPED | OUTPATIENT
Start: 2025-01-28

## 2025-01-28 NOTE — PROGRESS NOTES
Subjective   Brielle Alcocer is a 29 y.o. female with   Chief Complaint   Patient presents with    Cough     Patient states she is getting sick often, originally started before Thanksgiving, has been to [facility] 4 times, everytime she is tested for flu/covid it comes back negative    Nasal Congestion   .    Cough  Associated symptoms include postnasal drip. Pertinent negatives include no chest pain, fever, shortness of breath or wheezing.      29-year-old white female with increased congestion with postnasal drip and increased cough that has been off and on since November of last year.  Patient denies fever and there has been no loss of taste or smell.  At the moment she is having difficulty breathing at from her nose and is using Afrin nasal spray.For the most part secretions have been clear.  There have been no myalgias patient denies nausea/vomiting and there has been no diarrhea.  The following portions of the patient's history were reviewed and updated as appropriate: allergies, current medications, past family history, past medical history, past social history, past surgical history and problem list.    Review of Systems   Constitutional:  Negative for fever.   HENT:  Positive for congestion and postnasal drip.    Respiratory:  Positive for cough. Negative for shortness of breath and wheezing.    Cardiovascular:  Negative for chest pain.       Objective     Vitals:    01/28/25 0908   BP: 110/76   Pulse: 96   Temp: 96.9 °F (36.1 °C)   SpO2: 96%       Recent Results (from the past 4 weeks)   POCT SARS-CoV-2 + Flu Antigen CASE    Collection Time: 01/28/25 10:10 AM    Specimen: Swab   Result Value Ref Range    SARS Antigen Not Detected Not Detected, Presumptive Negative    Influenza A Antigen CASE Not Detected Not Detected    Influenza B Antigen CASE Not Detected Not Detected    Internal Control Passed Passed    Lot Number [id number]     Expiration Date 11-        Physical Exam  Vitals and nursing  note reviewed.   Constitutional:       Appearance: Normal appearance. She is well-developed and well-groomed.   HENT:      Head: Normocephalic and atraumatic.      Right Ear: Hearing, tympanic membrane, ear canal and external ear normal.      Left Ear: Hearing, tympanic membrane, ear canal and external ear normal.      Nose:      Right Turbinates: Enlarged.      Left Turbinates: Enlarged.      Mouth/Throat:      Lips: Pink.      Mouth: Mucous membranes are moist.      Pharynx: Uvula midline. Posterior oropharyngeal erythema present.   Neck:      Thyroid: No thyroid mass or thyromegaly.      Vascular: Normal carotid pulses. No carotid bruit.      Trachea: Trachea and phonation normal.   Cardiovascular:      Rate and Rhythm: Normal rate and regular rhythm.      Heart sounds: Normal heart sounds. No murmur heard.     No friction rub. No gallop.   Pulmonary:      Effort: Pulmonary effort is normal. No respiratory distress.      Breath sounds: Normal breath sounds. No decreased breath sounds, wheezing, rhonchi or rales.   Musculoskeletal:      Cervical back: Neck supple.   Lymphadenopathy:      Cervical: No cervical adenopathy.   Skin:     General: Skin is warm and dry.      Findings: No rash.   Neurological:      Mental Status: She is alert and oriented to person, place, and time.   Psychiatric:         Attention and Perception: Attention and perception normal.         Mood and Affect: Mood and affect normal.         Speech: Speech normal.         Behavior: Behavior normal. Behavior is cooperative.         Thought Content: Thought content normal.         Cognition and Memory: Cognition and memory normal.         Judgment: Judgment normal.         Assessment & Plan   Diagnoses and all orders for this visit:    1. URI, acute (Primary)  -     azithromycin (Zithromax Z-Earl) 250 MG tablet; Take 2 tablets the first day, then 1 tablet daily for 4 days.  Dispense: 6 tablet; Refill: 0    2. Cough, unspecified type  -     POCT  SARS-CoV-2 + Flu Antigen CASE    3. Nasal congestion  -     POCT SARS-CoV-2 + Flu Antigen CASE    Increase fluid and humidification.  12-hour pseudoephedrine would be beneficial as well as guaifenesin.    Return if symptoms worsen or fail to improve.